# Patient Record
Sex: MALE | Race: WHITE | HISPANIC OR LATINO | Employment: OTHER | URBAN - METROPOLITAN AREA
[De-identification: names, ages, dates, MRNs, and addresses within clinical notes are randomized per-mention and may not be internally consistent; named-entity substitution may affect disease eponyms.]

---

## 2017-01-01 ENCOUNTER — TELEPHONE (OUTPATIENT)
Dept: INTERVENTIONAL RADIOLOGY/VASCULAR | Facility: HOSPITAL | Age: 72
End: 2017-01-01

## 2017-01-01 ENCOUNTER — PATIENT MESSAGE (OUTPATIENT)
Dept: INTERVENTIONAL RADIOLOGY/VASCULAR | Facility: HOSPITAL | Age: 72
End: 2017-01-01

## 2017-01-01 ENCOUNTER — SURGERY (OUTPATIENT)
Age: 72
End: 2017-01-01

## 2017-01-01 ENCOUNTER — LAB VISIT (OUTPATIENT)
Dept: LAB | Facility: HOSPITAL | Age: 72
End: 2017-01-01
Payer: COMMERCIAL

## 2017-01-01 ENCOUNTER — HOSPITAL ENCOUNTER (OUTPATIENT)
Dept: RADIOLOGY | Facility: HOSPITAL | Age: 72
Discharge: HOME | End: 2017-01-16
Attending: FAMILY MEDICINE
Payer: COMMERCIAL

## 2017-01-01 ENCOUNTER — LAB VISIT (OUTPATIENT)
Dept: LAB | Facility: HOSPITAL | Age: 72
End: 2017-01-01
Attending: INTERNAL MEDICINE
Payer: COMMERCIAL

## 2017-01-01 ENCOUNTER — HOSPITAL ENCOUNTER (OUTPATIENT)
Facility: HOSPITAL | Age: 72
Discharge: HOME | End: 2017-01-19
Attending: RADIOLOGY | Admitting: RADIOLOGY
Payer: COMMERCIAL

## 2017-01-01 ENCOUNTER — DOCUMENTATION ONLY (OUTPATIENT)
Dept: INTERNAL MEDICINE | Facility: CLINIC | Age: 72
End: 2017-01-01

## 2017-01-01 ENCOUNTER — OFFICE VISIT (OUTPATIENT)
Dept: HEMATOLOGY/ONCOLOGY | Facility: CLINIC | Age: 72
End: 2017-01-01
Payer: COMMERCIAL

## 2017-01-01 ENCOUNTER — TELEPHONE (OUTPATIENT)
Dept: FAMILY MEDICINE | Facility: CLINIC | Age: 72
End: 2017-01-01

## 2017-01-01 ENCOUNTER — HOSPITAL ENCOUNTER (INPATIENT)
Facility: HOSPITAL | Age: 72
LOS: 4 days | DRG: 441 | End: 2017-02-13
Attending: EMERGENCY MEDICINE | Admitting: HOSPITALIST
Payer: COMMERCIAL

## 2017-01-01 ENCOUNTER — INFUSION (OUTPATIENT)
Dept: INFUSION THERAPY | Facility: HOSPITAL | Age: 72
End: 2017-01-01
Attending: INTERNAL MEDICINE
Payer: COMMERCIAL

## 2017-01-01 ENCOUNTER — OFFICE VISIT (OUTPATIENT)
Dept: HEPATOLOGY | Facility: CLINIC | Age: 72
End: 2017-01-01
Payer: COMMERCIAL

## 2017-01-01 ENCOUNTER — OFFICE VISIT (OUTPATIENT)
Dept: INTERNAL MEDICINE | Facility: CLINIC | Age: 72
DRG: 441 | End: 2017-01-01
Payer: COMMERCIAL

## 2017-01-01 ENCOUNTER — TELEPHONE (OUTPATIENT)
Dept: HEPATOLOGY | Facility: CLINIC | Age: 72
End: 2017-01-01

## 2017-01-01 ENCOUNTER — HOSPITAL ENCOUNTER (EMERGENCY)
Facility: HOSPITAL | Age: 72
Discharge: HOME | End: 2017-01-16
Attending: EMERGENCY MEDICINE
Payer: COMMERCIAL

## 2017-01-01 ENCOUNTER — OFFICE VISIT (OUTPATIENT)
Dept: INTERVENTIONAL RADIOLOGY/VASCULAR | Facility: CLINIC | Age: 72
End: 2017-01-01
Payer: COMMERCIAL

## 2017-01-01 ENCOUNTER — RESEARCH ENCOUNTER (OUTPATIENT)
Dept: RESEARCH | Facility: HOSPITAL | Age: 72
End: 2017-01-01

## 2017-01-01 VITALS
TEMPERATURE: 98 F | OXYGEN SATURATION: 100 % | SYSTOLIC BLOOD PRESSURE: 62 MMHG | WEIGHT: 146.38 LBS | HEIGHT: 66 IN | BODY MASS INDEX: 23.53 KG/M2 | DIASTOLIC BLOOD PRESSURE: 41 MMHG

## 2017-01-01 VITALS — SYSTOLIC BLOOD PRESSURE: 106 MMHG | HEART RATE: 78 BPM | RESPIRATION RATE: 14 BRPM | DIASTOLIC BLOOD PRESSURE: 56 MMHG

## 2017-01-01 VITALS
OXYGEN SATURATION: 98 % | DIASTOLIC BLOOD PRESSURE: 74 MMHG | RESPIRATION RATE: 18 BRPM | TEMPERATURE: 98 F | HEIGHT: 66 IN | SYSTOLIC BLOOD PRESSURE: 118 MMHG | HEART RATE: 98 BPM | BODY MASS INDEX: 22.98 KG/M2 | WEIGHT: 143 LBS

## 2017-01-01 VITALS
TEMPERATURE: 97 F | DIASTOLIC BLOOD PRESSURE: 60 MMHG | RESPIRATION RATE: 15 BRPM | HEART RATE: 84 BPM | SYSTOLIC BLOOD PRESSURE: 102 MMHG | WEIGHT: 138.88 LBS | HEIGHT: 66 IN | BODY MASS INDEX: 22.32 KG/M2 | OXYGEN SATURATION: 98 %

## 2017-01-01 VITALS
HEART RATE: 77 BPM | RESPIRATION RATE: 18 BRPM | HEIGHT: 66 IN | SYSTOLIC BLOOD PRESSURE: 110 MMHG | DIASTOLIC BLOOD PRESSURE: 59 MMHG | BODY MASS INDEX: 22.18 KG/M2 | OXYGEN SATURATION: 99 % | WEIGHT: 138 LBS

## 2017-01-01 VITALS
DIASTOLIC BLOOD PRESSURE: 63 MMHG | SYSTOLIC BLOOD PRESSURE: 112 MMHG | HEIGHT: 66 IN | RESPIRATION RATE: 16 BRPM | HEART RATE: 89 BPM | BODY MASS INDEX: 22.18 KG/M2 | WEIGHT: 138 LBS

## 2017-01-01 VITALS
WEIGHT: 142 LBS | TEMPERATURE: 97 F | SYSTOLIC BLOOD PRESSURE: 89 MMHG | DIASTOLIC BLOOD PRESSURE: 59 MMHG | BODY MASS INDEX: 22.92 KG/M2 | HEART RATE: 98 BPM

## 2017-01-01 VITALS
WEIGHT: 141 LBS | BODY MASS INDEX: 22.66 KG/M2 | SYSTOLIC BLOOD PRESSURE: 103 MMHG | DIASTOLIC BLOOD PRESSURE: 62 MMHG | HEIGHT: 66 IN | HEART RATE: 87 BPM

## 2017-01-01 DIAGNOSIS — C22.0 HCC (HEPATOCELLULAR CARCINOMA): ICD-10-CM

## 2017-01-01 DIAGNOSIS — N17.9 ACUTE RENAL FAILURE: ICD-10-CM

## 2017-01-01 DIAGNOSIS — N19 RENAL FAILURE: Primary | ICD-10-CM

## 2017-01-01 DIAGNOSIS — R82.90 ABNORMAL URINE FINDINGS: ICD-10-CM

## 2017-01-01 DIAGNOSIS — C22.0 HCC (HEPATOCELLULAR CARCINOMA): Primary | ICD-10-CM

## 2017-01-01 DIAGNOSIS — I10 ESSENTIAL HYPERTENSION: ICD-10-CM

## 2017-01-01 DIAGNOSIS — E87.20 METABOLIC ACIDOSIS, NORMAL ANION GAP (NAG): ICD-10-CM

## 2017-01-01 DIAGNOSIS — R18.8 ASCITES OF LIVER: ICD-10-CM

## 2017-01-01 DIAGNOSIS — N17.9 AKI (ACUTE KIDNEY INJURY): ICD-10-CM

## 2017-01-01 DIAGNOSIS — E87.5 HYPERKALEMIA: ICD-10-CM

## 2017-01-01 DIAGNOSIS — E87.5 HYPERKALEMIA: Primary | ICD-10-CM

## 2017-01-01 DIAGNOSIS — Z51.11 ENCOUNTER FOR CHEMOTHERAPY MANAGEMENT: ICD-10-CM

## 2017-01-01 DIAGNOSIS — R79.89 ABNORMAL LFTS (LIVER FUNCTION TESTS): ICD-10-CM

## 2017-01-01 DIAGNOSIS — N17.9 ACUTE RENAL FAILURE, UNSPECIFIED ACUTE RENAL FAILURE TYPE: ICD-10-CM

## 2017-01-01 DIAGNOSIS — I95.9 HYPOTENSION, UNSPECIFIED HYPOTENSION TYPE: ICD-10-CM

## 2017-01-01 DIAGNOSIS — K74.60 CIRRHOSIS OF LIVER WITH ASCITES, UNSPECIFIED HEPATIC CIRRHOSIS TYPE: ICD-10-CM

## 2017-01-01 DIAGNOSIS — R18.8 OTHER ASCITES: ICD-10-CM

## 2017-01-01 DIAGNOSIS — G93.41 ACUTE METABOLIC ENCEPHALOPATHY: ICD-10-CM

## 2017-01-01 DIAGNOSIS — N18.30 CKD (CHRONIC KIDNEY DISEASE) STAGE 3, GFR 30-59 ML/MIN: ICD-10-CM

## 2017-01-01 DIAGNOSIS — G93.41 ENCEPHALOPATHY, METABOLIC: ICD-10-CM

## 2017-01-01 DIAGNOSIS — J96.01 ACUTE HYPOXEMIC RESPIRATORY FAILURE: ICD-10-CM

## 2017-01-01 DIAGNOSIS — R18.8 CIRRHOSIS OF LIVER WITH ASCITES, UNSPECIFIED HEPATIC CIRRHOSIS TYPE: ICD-10-CM

## 2017-01-01 DIAGNOSIS — D63.0 ANEMIA IN NEOPLASTIC DISEASE: ICD-10-CM

## 2017-01-01 LAB
ABO + RH BLD: NORMAL
ALBUMIN SERPL BCP-MCNC: 2 G/DL
ALBUMIN SERPL BCP-MCNC: 2.1 G/DL
ALBUMIN SERPL BCP-MCNC: 2.2 G/DL
ALBUMIN SERPL BCP-MCNC: 2.7 G/DL
ALBUMIN SERPL BCP-MCNC: 3.5 G/DL
ALBUMIN SERPL BCP-MCNC: 3.6 G/DL
ALBUMIN SERPL BCP-MCNC: 3.7 G/DL
ALBUMIN SERPL BCP-MCNC: 3.8 G/DL
ALBUMIN SERPL BCP-MCNC: 3.9 G/DL
ALBUMIN SERPL BCP-MCNC: 3.9 G/DL
ALLENS TEST: ABNORMAL
ALP SERPL-CCNC: 109 U/L
ALP SERPL-CCNC: 171 U/L
ALP SERPL-CCNC: 216 U/L
ALP SERPL-CCNC: 217 U/L
ALP SERPL-CCNC: 247 U/L
ALP SERPL-CCNC: 283 U/L
ALP SERPL-CCNC: 77 U/L
ALT SERPL W/O P-5'-P-CCNC: 12 U/L
ALT SERPL W/O P-5'-P-CCNC: 16 U/L
ALT SERPL W/O P-5'-P-CCNC: 17 U/L
ALT SERPL W/O P-5'-P-CCNC: 20 U/L
ALT SERPL W/O P-5'-P-CCNC: 24 U/L
ALT SERPL W/O P-5'-P-CCNC: 27 U/L
ALT SERPL W/O P-5'-P-CCNC: 29 U/L
AMMONIA PLAS-SCNC: 216 UMOL/L
ANION GAP SERPL CALC-SCNC: 10 MMOL/L
ANION GAP SERPL CALC-SCNC: 11 MMOL/L
ANION GAP SERPL CALC-SCNC: 12 MMOL/L
ANION GAP SERPL CALC-SCNC: 14 MMOL/L
ANION GAP SERPL CALC-SCNC: 16 MMOL/L
ANION GAP SERPL CALC-SCNC: 5 MMOL/L
ANION GAP SERPL CALC-SCNC: 6 MMOL/L
ANION GAP SERPL CALC-SCNC: 7 MMOL/L
ANION GAP SERPL CALC-SCNC: 8 MMOL/L
ANISOCYTOSIS BLD QL SMEAR: SLIGHT
AST SERPL-CCNC: 26 U/L
AST SERPL-CCNC: 28 U/L
AST SERPL-CCNC: 34 U/L
AST SERPL-CCNC: 43 U/L
AST SERPL-CCNC: 48 U/L
AST SERPL-CCNC: 50 U/L
AST SERPL-CCNC: 54 U/L
BACTERIA #/AREA URNS AUTO: ABNORMAL /HPF
BACTERIA #/AREA URNS AUTO: ABNORMAL /HPF
BACTERIA STL CULT: NORMAL
BACTERIA UR CULT: NORMAL
BACTERIA UR CULT: NORMAL
BASOPHILS # BLD AUTO: 0 K/UL
BASOPHILS # BLD AUTO: 0.01 K/UL
BASOPHILS # BLD AUTO: 0.02 K/UL
BASOPHILS # BLD AUTO: 0.03 K/UL
BASOPHILS NFR BLD: 0 %
BASOPHILS NFR BLD: 0.1 %
BASOPHILS NFR BLD: 0.1 %
BASOPHILS NFR BLD: 0.2 %
BASOPHILS NFR BLD: 0.3 %
BASOPHILS NFR BLD: 0.3 %
BASOPHILS NFR BLD: 0.4 %
BASOPHILS NFR BLD: 0.6 %
BILIRUB SERPL-MCNC: 2.1 MG/DL
BILIRUB SERPL-MCNC: 2.2 MG/DL
BILIRUB SERPL-MCNC: 2.4 MG/DL
BILIRUB SERPL-MCNC: 2.6 MG/DL
BILIRUB SERPL-MCNC: 2.9 MG/DL
BILIRUB SERPL-MCNC: 3.2 MG/DL
BILIRUB SERPL-MCNC: 4.9 MG/DL
BILIRUB UR QL STRIP: NEGATIVE
BILIRUB UR QL STRIP: NEGATIVE
BLD GP AB SCN CELLS X3 SERPL QL: NORMAL
BLD PROD TYP BPU: NORMAL
BLOOD UNIT EXPIRATION DATE: NORMAL
BLOOD UNIT TYPE CODE: 5100
BLOOD UNIT TYPE CODE: 9500
BLOOD UNIT TYPE: NORMAL
BUN SERPL-MCNC: 10 MG/DL
BUN SERPL-MCNC: 10 MG/DL
BUN SERPL-MCNC: 2 MG/DL
BUN SERPL-MCNC: 2 MG/DL
BUN SERPL-MCNC: 3 MG/DL
BUN SERPL-MCNC: 35 MG/DL
BUN SERPL-MCNC: 4 MG/DL
BUN SERPL-MCNC: 42 MG/DL
BUN SERPL-MCNC: 42 MG/DL (ref 6–30)
BUN SERPL-MCNC: 47 MG/DL
BUN SERPL-MCNC: 52 MG/DL
BUN SERPL-MCNC: 84 MG/DL
BUN SERPL-MCNC: 88 MG/DL
BUN SERPL-MCNC: 90 MG/DL
BUN SERPL-MCNC: 91 MG/DL
C DIFF GDH STL QL: NEGATIVE
C DIFF TOX A+B STL QL IA: NEGATIVE
CA-I BLDV-SCNC: 1.12 MMOL/L
CALCIUM SERPL-MCNC: 7.8 MG/DL
CALCIUM SERPL-MCNC: 8 MG/DL
CALCIUM SERPL-MCNC: 8.1 MG/DL
CALCIUM SERPL-MCNC: 8.3 MG/DL
CALCIUM SERPL-MCNC: 8.5 MG/DL
CALCIUM SERPL-MCNC: 8.5 MG/DL
CALCIUM SERPL-MCNC: 8.6 MG/DL
CALCIUM SERPL-MCNC: 8.7 MG/DL
CALCIUM SERPL-MCNC: 8.7 MG/DL
CALCIUM SERPL-MCNC: 9.1 MG/DL
CHLORIDE SERPL-SCNC: 107 MMOL/L
CHLORIDE SERPL-SCNC: 108 MMOL/L
CHLORIDE SERPL-SCNC: 111 MMOL/L
CHLORIDE SERPL-SCNC: 111 MMOL/L (ref 95–110)
CHLORIDE SERPL-SCNC: 112 MMOL/L
CHLORIDE SERPL-SCNC: 112 MMOL/L
CHLORIDE SERPL-SCNC: 113 MMOL/L
CHLORIDE STL-SCNC: NORMAL MMOL/L
CHLORIDE UR-SCNC: 27 MMOL/L
CLARITY UR REFRACT.AUTO: ABNORMAL
CLARITY UR REFRACT.AUTO: CLEAR
CO2 SERPL-SCNC: 10 MMOL/L
CO2 SERPL-SCNC: 15 MMOL/L
CO2 SERPL-SCNC: 16 MMOL/L
CO2 SERPL-SCNC: 16 MMOL/L
CO2 SERPL-SCNC: 18 MMOL/L
CO2 SERPL-SCNC: 21 MMOL/L
CO2 SERPL-SCNC: 21 MMOL/L
CO2 SERPL-SCNC: 23 MMOL/L
CO2 SERPL-SCNC: 24 MMOL/L
CO2 SERPL-SCNC: 24 MMOL/L
CO2 SERPL-SCNC: 25 MMOL/L
CO2 SERPL-SCNC: 7 MMOL/L
CO2 SERPL-SCNC: 8 MMOL/L
CO2 SERPL-SCNC: 9 MMOL/L
CODING SYSTEM: NORMAL
COLOR UR AUTO: YELLOW
COLOR UR AUTO: YELLOW
CREAT SERPL-MCNC: 0.7 MG/DL
CREAT SERPL-MCNC: 0.7 MG/DL
CREAT SERPL-MCNC: 0.8 MG/DL
CREAT SERPL-MCNC: 0.8 MG/DL
CREAT SERPL-MCNC: 1.3 MG/DL
CREAT SERPL-MCNC: 1.6 MG/DL
CREAT SERPL-MCNC: 1.6 MG/DL (ref 0.5–1.4)
CREAT SERPL-MCNC: 1.8 MG/DL
CREAT SERPL-MCNC: 3.4 MG/DL
CREAT SERPL-MCNC: 5.7 MG/DL
CREAT SERPL-MCNC: 5.9 MG/DL
CREAT SERPL-MCNC: 6 MG/DL
CREAT SERPL-MCNC: 6.2 MG/DL
CREAT UR-MCNC: 106 MG/DL
CREAT UR-MCNC: 106 MG/DL
DELSYS: ABNORMAL
DIFFERENTIAL METHOD: ABNORMAL
DISPENSE STATUS: NORMAL
E COLI SXT1 STL QL IA: NEGATIVE
E COLI SXT2 STL QL IA: NEGATIVE
EOSINOPHIL # BLD AUTO: 0 K/UL
EOSINOPHIL # BLD AUTO: 0 K/UL
EOSINOPHIL # BLD AUTO: 0.1 K/UL
EOSINOPHIL NFR BLD: 0.3 %
EOSINOPHIL NFR BLD: 0.8 %
EOSINOPHIL NFR BLD: 0.9 %
EOSINOPHIL NFR BLD: 1 %
EOSINOPHIL NFR BLD: 1 %
EOSINOPHIL NFR BLD: 1.3 %
EOSINOPHIL NFR BLD: 1.5 %
EOSINOPHIL NFR BLD: 1.8 %
EOSINOPHIL URNS QL WRIGHT STN: NORMAL
ERYTHROCYTE [DISTWIDTH] IN BLOOD BY AUTOMATED COUNT: 14.8 %
ERYTHROCYTE [DISTWIDTH] IN BLOOD BY AUTOMATED COUNT: 14.8 %
ERYTHROCYTE [DISTWIDTH] IN BLOOD BY AUTOMATED COUNT: 15 %
ERYTHROCYTE [DISTWIDTH] IN BLOOD BY AUTOMATED COUNT: 15.3 %
ERYTHROCYTE [DISTWIDTH] IN BLOOD BY AUTOMATED COUNT: 15.3 %
ERYTHROCYTE [DISTWIDTH] IN BLOOD BY AUTOMATED COUNT: 15.9 %
ERYTHROCYTE [DISTWIDTH] IN BLOOD BY AUTOMATED COUNT: 16.1 %
ERYTHROCYTE [DISTWIDTH] IN BLOOD BY AUTOMATED COUNT: 18.6 %
ERYTHROCYTE [DISTWIDTH] IN BLOOD BY AUTOMATED COUNT: 18.9 %
ERYTHROCYTE [DISTWIDTH] IN BLOOD BY AUTOMATED COUNT: 19.1 %
ERYTHROCYTE [SEDIMENTATION RATE] IN BLOOD BY WESTERGREN METHOD: 20 MM/H
EST. GFR  (AFRICAN AMERICAN): 10.1 ML/MIN/1.73 M^2
EST. GFR  (AFRICAN AMERICAN): 10.3 ML/MIN/1.73 M^2
EST. GFR  (AFRICAN AMERICAN): 10.7 ML/MIN/1.73 M^2
EST. GFR  (AFRICAN AMERICAN): 20 ML/MIN/1.73 M^2
EST. GFR  (AFRICAN AMERICAN): 43.1 ML/MIN/1.73 M^2
EST. GFR  (AFRICAN AMERICAN): 49.7 ML/MIN/1.73 M^2
EST. GFR  (AFRICAN AMERICAN): 9.7 ML/MIN/1.73 M^2
EST. GFR  (AFRICAN AMERICAN): >60 ML/MIN/1.73 M^2
EST. GFR  (NON AFRICAN AMERICAN): 17.3 ML/MIN/1.73 M^2
EST. GFR  (NON AFRICAN AMERICAN): 37.3 ML/MIN/1.73 M^2
EST. GFR  (NON AFRICAN AMERICAN): 43 ML/MIN/1.73 M^2
EST. GFR  (NON AFRICAN AMERICAN): 55.3 ML/MIN/1.73 M^2
EST. GFR  (NON AFRICAN AMERICAN): 8.4 ML/MIN/1.73 M^2
EST. GFR  (NON AFRICAN AMERICAN): 8.7 ML/MIN/1.73 M^2
EST. GFR  (NON AFRICAN AMERICAN): 8.9 ML/MIN/1.73 M^2
EST. GFR  (NON AFRICAN AMERICAN): 9.3 ML/MIN/1.73 M^2
EST. GFR  (NON AFRICAN AMERICAN): >60 ML/MIN/1.73 M^2
FAT STL SUDAN IV STN: NORMAL
FIBRINOGEN PPP-MCNC: 110 MG/DL
FIBRINOGEN PPP-MCNC: 190 MG/DL
FIBRINOGEN PPP-MCNC: 206 MG/DL
FIBRINOGEN PPP-MCNC: 212 MG/DL
FIBRINOGEN PPP-MCNC: 239 MG/DL
FIBRINOGEN PPP-MCNC: 300 MG/DL
FIBRINOGEN PPP-MCNC: 95 MG/DL
FIO2: 21
FIO2: 50
FIO2: 60
FLOW: 2
GLUCOSE SERPL-MCNC: 101 MG/DL
GLUCOSE SERPL-MCNC: 118 MG/DL
GLUCOSE SERPL-MCNC: 119 MG/DL
GLUCOSE SERPL-MCNC: 123 MG/DL
GLUCOSE SERPL-MCNC: 125 MG/DL
GLUCOSE SERPL-MCNC: 129 MG/DL
GLUCOSE SERPL-MCNC: 133 MG/DL
GLUCOSE SERPL-MCNC: 143 MG/DL
GLUCOSE SERPL-MCNC: 143 MG/DL
GLUCOSE SERPL-MCNC: 146 MG/DL
GLUCOSE SERPL-MCNC: 83 MG/DL
GLUCOSE SERPL-MCNC: 92 MG/DL
GLUCOSE SERPL-MCNC: 97 MG/DL
GLUCOSE SERPL-MCNC: 97 MG/DL
GLUCOSE SERPL-MCNC: 97 MG/DL (ref 70–110)
GLUCOSE UR QL STRIP: NEGATIVE
GLUCOSE UR QL STRIP: NEGATIVE
HAPTOGLOB SERPL-MCNC: 37 MG/DL
HCO3 UR-SCNC: 10.7 MMOL/L (ref 24–28)
HCO3 UR-SCNC: 15.2 MMOL/L (ref 24–28)
HCO3 UR-SCNC: 18.3 MMOL/L (ref 24–28)
HCO3 UR-SCNC: 22.6 MMOL/L (ref 24–28)
HCO3 UR-SCNC: 25.5 MMOL/L (ref 24–28)
HCO3 UR-SCNC: 8.5 MMOL/L (ref 24–28)
HCO3 UR-SCNC: 8.8 MMOL/L (ref 24–28)
HCO3 UR-SCNC: 9 MMOL/L (ref 24–28)
HCT VFR BLD AUTO: 17.8 %
HCT VFR BLD AUTO: 19.3 %
HCT VFR BLD AUTO: 20.5 %
HCT VFR BLD AUTO: 21.2 %
HCT VFR BLD AUTO: 22.5 %
HCT VFR BLD AUTO: 22.6 %
HCT VFR BLD AUTO: 25.1 %
HCT VFR BLD AUTO: 28.3 %
HCT VFR BLD AUTO: 28.9 %
HCT VFR BLD AUTO: 34.6 %
HCT VFR BLD CALC: 26 %PCV (ref 36–54)
HCT VFR BLD CALC: 34 %PCV (ref 36–54)
HEPARIN INDUCED THROMBOCYTOPENIA: NORMAL
HGB BLD-MCNC: 10.3 G/DL
HGB BLD-MCNC: 11.8 G/DL
HGB BLD-MCNC: 6.3 G/DL
HGB BLD-MCNC: 6.6 G/DL
HGB BLD-MCNC: 7.1 G/DL
HGB BLD-MCNC: 7.4 G/DL
HGB BLD-MCNC: 7.8 G/DL
HGB BLD-MCNC: 8.2 G/DL
HGB BLD-MCNC: 8.9 G/DL
HGB BLD-MCNC: 9.9 G/DL
HGB UR QL STRIP: ABNORMAL
HGB UR QL STRIP: NEGATIVE
HYALINE CASTS UR QL AUTO: 9 /LPF
HYALINE CASTS UR QL AUTO: 9 /LPF
HYPOCHROMIA BLD QL SMEAR: ABNORMAL
HYPOCHROMIA BLD QL SMEAR: ABNORMAL
INR PPP: 1.2
INR PPP: 1.4
INR PPP: 1.5
INR PPP: 1.6
INR PPP: 1.7
INR PPP: 1.7
KETONES UR QL STRIP: NEGATIVE
KETONES UR QL STRIP: NEGATIVE
LACTATE SERPL-SCNC: 2.1 MMOL/L
LACTATE SERPL-SCNC: 2.1 MMOL/L
LACTATE SERPL-SCNC: 2.3 MMOL/L
LACTATE SERPL-SCNC: 3.3 MMOL/L
LACTATE SERPL-SCNC: 3.7 MMOL/L
LDH SERPL L TO P-CCNC: 136 U/L
LEUKOCYTE ESTERASE UR QL STRIP: ABNORMAL
LEUKOCYTE ESTERASE UR QL STRIP: ABNORMAL
LYMPHOCYTES # BLD AUTO: 0.3 K/UL
LYMPHOCYTES # BLD AUTO: 0.4 K/UL
LYMPHOCYTES # BLD AUTO: 0.5 K/UL
LYMPHOCYTES # BLD AUTO: 0.6 K/UL
LYMPHOCYTES # BLD AUTO: 0.7 K/UL
LYMPHOCYTES # BLD AUTO: 0.7 K/UL
LYMPHOCYTES # BLD AUTO: 0.9 K/UL
LYMPHOCYTES # BLD AUTO: 1 K/UL
LYMPHOCYTES NFR BLD: 10.7 %
LYMPHOCYTES NFR BLD: 11 %
LYMPHOCYTES NFR BLD: 11.2 %
LYMPHOCYTES NFR BLD: 11.4 %
LYMPHOCYTES NFR BLD: 12.5 %
LYMPHOCYTES NFR BLD: 14.2 %
LYMPHOCYTES NFR BLD: 7.9 %
LYMPHOCYTES NFR BLD: 8.1 %
LYMPHOCYTES NFR BLD: 9.2 %
LYMPHOCYTES NFR BLD: 9.7 %
MAGNESIUM FLD-MCNC: NORMAL MG/DL
MAGNESIUM SERPL-MCNC: 1.7 MG/DL
MAGNESIUM SERPL-MCNC: 1.8 MG/DL
MAGNESIUM SERPL-MCNC: 1.9 MG/DL
MAGNESIUM SERPL-MCNC: 2 MG/DL
MAGNESIUM SERPL-MCNC: 2.1 MG/DL
MAGNESIUM SERPL-MCNC: 2.1 MG/DL
MAGNESIUM SERPL-MCNC: 2.2 MG/DL
MAGNESIUM SERPL-MCNC: 2.3 MG/DL
MCH RBC QN AUTO: 32.5 PG
MCH RBC QN AUTO: 34 PG
MCH RBC QN AUTO: 34 PG
MCH RBC QN AUTO: 36.5 PG
MCH RBC QN AUTO: 36.8 PG
MCH RBC QN AUTO: 37.1 PG
MCH RBC QN AUTO: 37.1 PG
MCH RBC QN AUTO: 37.3 PG
MCH RBC QN AUTO: 37.6 PG
MCH RBC QN AUTO: 38 PG
MCHC RBC AUTO-ENTMCNC: 33.5 %
MCHC RBC AUTO-ENTMCNC: 34.1 %
MCHC RBC AUTO-ENTMCNC: 34.2 %
MCHC RBC AUTO-ENTMCNC: 34.5 %
MCHC RBC AUTO-ENTMCNC: 35 %
MCHC RBC AUTO-ENTMCNC: 35.4 %
MCHC RBC AUTO-ENTMCNC: 35.5 %
MCHC RBC AUTO-ENTMCNC: 35.6 %
MCHC RBC AUTO-ENTMCNC: 36.1 %
MCHC RBC AUTO-ENTMCNC: 36.4 %
MCV RBC AUTO: 100 FL
MCV RBC AUTO: 101 FL
MCV RBC AUTO: 102 FL
MCV RBC AUTO: 103 FL
MCV RBC AUTO: 104 FL
MCV RBC AUTO: 104 FL
MCV RBC AUTO: 105 FL
MCV RBC AUTO: 107 FL
MCV RBC AUTO: 110 FL
MCV RBC AUTO: 94 FL
MICROSCOPIC COMMENT: ABNORMAL
MICROSCOPIC COMMENT: ABNORMAL
MODE: ABNORMAL
MONOCYTES # BLD AUTO: 0 K/UL
MONOCYTES # BLD AUTO: 0.5 K/UL
MONOCYTES # BLD AUTO: 0.6 K/UL
MONOCYTES # BLD AUTO: 0.6 K/UL
MONOCYTES # BLD AUTO: 0.7 K/UL
MONOCYTES # BLD AUTO: 0.8 K/UL
MONOCYTES # BLD AUTO: 1 K/UL
MONOCYTES NFR BLD: 0.9 %
MONOCYTES NFR BLD: 10.1 %
MONOCYTES NFR BLD: 15.6 %
MONOCYTES NFR BLD: 16.9 %
MONOCYTES NFR BLD: 17.2 %
MONOCYTES NFR BLD: 6.6 %
MONOCYTES NFR BLD: 6.7 %
MONOCYTES NFR BLD: 9 %
MONOCYTES NFR BLD: 9 %
MONOCYTES NFR BLD: 9.8 %
NEUTROPHILS # BLD AUTO: 2.2 K/UL
NEUTROPHILS # BLD AUTO: 2.7 K/UL
NEUTROPHILS # BLD AUTO: 2.8 K/UL
NEUTROPHILS # BLD AUTO: 3.8 K/UL
NEUTROPHILS # BLD AUTO: 6 K/UL
NEUTROPHILS # BLD AUTO: 6.2 K/UL
NEUTROPHILS # BLD AUTO: 6.6 K/UL
NEUTROPHILS # BLD AUTO: 7 K/UL
NEUTROPHILS # BLD AUTO: 7.5 K/UL
NEUTROPHILS # BLD AUTO: 7.6 K/UL
NEUTROPHILS NFR BLD: 66.2 %
NEUTROPHILS NFR BLD: 70.2 %
NEUTROPHILS NFR BLD: 70.3 %
NEUTROPHILS NFR BLD: 78.5 %
NEUTROPHILS NFR BLD: 78.8 %
NEUTROPHILS NFR BLD: 79 %
NEUTROPHILS NFR BLD: 81.1 %
NEUTROPHILS NFR BLD: 81.1 %
NEUTROPHILS NFR BLD: 84.4 %
NEUTROPHILS NFR BLD: 88.8 %
NITRITE UR QL STRIP: NEGATIVE
NITRITE UR QL STRIP: NEGATIVE
NUM UNITS TRANS FFP: NORMAL
O+P STL TRI STN: NORMAL
OSMOL GAP STL: NORMAL MOSM/KG
OSMOLALITY STL: NORMAL MOSM/KG
OVALOCYTES BLD QL SMEAR: ABNORMAL
PCO2 BLDA: 16.8 MMHG (ref 35–45)
PCO2 BLDA: 17.1 MMHG (ref 35–45)
PCO2 BLDA: 19.4 MMHG (ref 35–45)
PCO2 BLDA: 23.1 MMHG (ref 35–45)
PCO2 BLDA: 25.6 MMHG (ref 35–45)
PCO2 BLDA: 28 MMHG (ref 35–45)
PCO2 BLDA: 35.5 MMHG (ref 35–45)
PCO2 BLDA: 42.2 MMHG (ref 35–45)
PEEP: 5
PH SMN: 7.28 [PH] (ref 7.35–7.45)
PH SMN: 7.28 [PH] (ref 7.35–7.45)
PH SMN: 7.31 [PH] (ref 7.35–7.45)
PH SMN: 7.32 [PH] (ref 7.35–7.45)
PH SMN: 7.38 [PH] (ref 7.35–7.45)
PH SMN: 7.39 [PH] (ref 7.35–7.45)
PH SMN: 7.41 [PH] (ref 7.35–7.45)
PH SMN: 7.42 [PH] (ref 7.35–7.45)
PH UR STRIP: 5 [PH] (ref 5–8)
PH UR STRIP: 6 [PH] (ref 5–8)
PHOSPHATE SERPL-MCNC: 1.6 MG/DL
PHOSPHATE SERPL-MCNC: 1.6 MG/DL
PHOSPHATE SERPL-MCNC: 2.2 MG/DL
PHOSPHATE SERPL-MCNC: 2.2 MG/DL
PHOSPHATE SERPL-MCNC: 2.9 MG/DL
PHOSPHATE SERPL-MCNC: 2.9 MG/DL
PHOSPHATE SERPL-MCNC: 3.6 MG/DL
PHOSPHATE SERPL-MCNC: 3.8 MG/DL
PHOSPHATE SERPL-MCNC: 5.7 MG/DL
PHOSPHATE SERPL-MCNC: 6.6 MG/DL
PHOSPHATE SERPL-MCNC: 7 MG/DL
PHOSPHORUS, FECES: NORMAL
PLATELET # BLD AUTO: 102 K/UL
PLATELET # BLD AUTO: 103 K/UL
PLATELET # BLD AUTO: 126 K/UL
PLATELET # BLD AUTO: 33 K/UL
PLATELET # BLD AUTO: 33 K/UL
PLATELET # BLD AUTO: 42 K/UL
PLATELET # BLD AUTO: 51 K/UL
PLATELET # BLD AUTO: 60 K/UL
PLATELET # BLD AUTO: 80 K/UL
PLATELET # BLD AUTO: 81 K/UL
PLATELET BLD QL SMEAR: ABNORMAL
PLATELET BLD QL SMEAR: ABNORMAL
PMV BLD AUTO: 10 FL
PMV BLD AUTO: 10.2 FL
PMV BLD AUTO: 10.9 FL
PMV BLD AUTO: 11.2 FL
PMV BLD AUTO: 12.5 FL
PMV BLD AUTO: 8.7 FL
PMV BLD AUTO: 9.3 FL
PMV BLD AUTO: 9.7 FL
PMV BLD AUTO: 9.8 FL
PMV BLD AUTO: ABNORMAL FL
PO2 BLDA: 101 MMHG (ref 80–100)
PO2 BLDA: 106 MMHG (ref 80–100)
PO2 BLDA: 33 MMHG (ref 40–60)
PO2 BLDA: 34 MMHG (ref 40–60)
PO2 BLDA: 36 MMHG (ref 40–60)
PO2 BLDA: 68 MMHG (ref 40–60)
PO2 BLDA: 69 MMHG (ref 80–100)
PO2 BLDA: 98 MMHG (ref 80–100)
POC BE: -10 MMOL/L
POC BE: -16 MMOL/L
POC BE: -17 MMOL/L
POC BE: -18 MMOL/L
POC BE: -18 MMOL/L
POC BE: -2 MMOL/L
POC BE: -6 MMOL/L
POC BE: 1 MMOL/L
POC IONIZED CALCIUM: 1.11 MMOL/L (ref 1.06–1.42)
POC IONIZED CALCIUM: 1.19 MMOL/L (ref 1.06–1.42)
POC SATURATED O2: 67 % (ref 95–100)
POC SATURATED O2: 67 % (ref 95–100)
POC SATURATED O2: 70 % (ref 95–100)
POC SATURATED O2: 91 % (ref 95–100)
POC SATURATED O2: 93 % (ref 95–100)
POC SATURATED O2: 97 % (ref 95–100)
POC SATURATED O2: 97 % (ref 95–100)
POC SATURATED O2: 98 % (ref 95–100)
POC TCO2 (MEASURED): ABNORMAL MMOL/L
POC TCO2: 10 MMOL/L (ref 23–27)
POC TCO2: 11 MMOL/L (ref 24–29)
POC TCO2: 16 MMOL/L (ref 24–29)
POC TCO2: 19 MMOL/L (ref 24–29)
POC TCO2: 24 MMOL/L (ref 24–29)
POC TCO2: 27 MMOL/L (ref 23–27)
POC TCO2: 9 MMOL/L (ref 23–27)
POC TCO2: 9 MMOL/L (ref 23–27)
POCT GLUCOSE: 104 MG/DL (ref 70–110)
POCT GLUCOSE: 115 MG/DL (ref 70–110)
POCT GLUCOSE: 118 MG/DL (ref 70–110)
POCT GLUCOSE: 119 MG/DL (ref 70–110)
POCT GLUCOSE: 121 MG/DL (ref 70–110)
POCT GLUCOSE: 136 MG/DL (ref 70–110)
POCT GLUCOSE: 144 MG/DL (ref 70–110)
POCT GLUCOSE: 148 MG/DL (ref 70–110)
POCT GLUCOSE: 148 MG/DL (ref 70–110)
POCT GLUCOSE: 160 MG/DL (ref 70–110)
POIKILOCYTOSIS BLD QL SMEAR: SLIGHT
POLYCHROMASIA BLD QL SMEAR: ABNORMAL
POOLED CRYOPPT GVN BPU: NORMAL
POOLED CRYOPPT GVN BPU: NORMAL
POTASSIUM BLD-SCNC: 4.1 MMOL/L (ref 3.5–5.1)
POTASSIUM BLD-SCNC: 5.1 MMOL/L (ref 3.5–5.1)
POTASSIUM FLD-SCNC: NORMAL MMOL/L
POTASSIUM SERPL-SCNC: 4 MMOL/L
POTASSIUM SERPL-SCNC: 4.1 MMOL/L
POTASSIUM SERPL-SCNC: 4.3 MMOL/L
POTASSIUM SERPL-SCNC: 4.3 MMOL/L
POTASSIUM SERPL-SCNC: 4.4 MMOL/L
POTASSIUM SERPL-SCNC: 4.6 MMOL/L
POTASSIUM SERPL-SCNC: 4.6 MMOL/L
POTASSIUM SERPL-SCNC: 5.3 MMOL/L
POTASSIUM SERPL-SCNC: 5.3 MMOL/L
POTASSIUM SERPL-SCNC: 5.7 MMOL/L
POTASSIUM UR-SCNC: 19 MMOL/L
PROCALCITONIN SERPL IA-MCNC: 1.14 NG/ML
PROLACTIN SERPL IA-MCNC: 23.6 NG/ML
PROT SERPL-MCNC: 5.5 G/DL
PROT SERPL-MCNC: 6 G/DL
PROT SERPL-MCNC: 6.1 G/DL
PROT SERPL-MCNC: 6.6 G/DL
PROT SERPL-MCNC: 6.7 G/DL
PROT SERPL-MCNC: 6.8 G/DL
PROT SERPL-MCNC: 7 G/DL
PROT UR QL STRIP: ABNORMAL
PROT UR QL STRIP: NEGATIVE
PROT UR-MCNC: 14 MG/DL
PROT/CREAT RATIO, UR: 0.13
PROTHROMBIN TIME: 12.7 SEC
PROTHROMBIN TIME: 14.8 SEC
PROTHROMBIN TIME: 15.2 SEC
PROTHROMBIN TIME: 15.3 SEC
PROTHROMBIN TIME: 15.4 SEC
PROTHROMBIN TIME: 16.1 SEC
PROTHROMBIN TIME: 16.9 SEC
PROTHROMBIN TIME: 17 SEC
RBC # BLD AUTO: 1.66 M/UL
RBC # BLD AUTO: 1.94 M/UL
RBC # BLD AUTO: 1.97 M/UL
RBC # BLD AUTO: 2.09 M/UL
RBC # BLD AUTO: 2.21 M/UL
RBC # BLD AUTO: 2.4 M/UL
RBC # BLD AUTO: 2.44 M/UL
RBC # BLD AUTO: 2.69 M/UL
RBC # BLD AUTO: 2.78 M/UL
RBC # BLD AUTO: 3.16 M/UL
RBC #/AREA URNS AUTO: 1 /HPF (ref 0–4)
RBC #/AREA URNS AUTO: 1 /HPF (ref 0–4)
SAMPLE: ABNORMAL
SITE: ABNORMAL
SODIUM BLD-SCNC: 135 MMOL/L (ref 136–145)
SODIUM BLD-SCNC: 136 MMOL/L (ref 136–145)
SODIUM SERPL-SCNC: 130 MMOL/L
SODIUM SERPL-SCNC: 132 MMOL/L
SODIUM SERPL-SCNC: 133 MMOL/L
SODIUM SERPL-SCNC: 133 MMOL/L
SODIUM SERPL-SCNC: 134 MMOL/L
SODIUM SERPL-SCNC: 135 MMOL/L
SODIUM SERPL-SCNC: 136 MMOL/L
SODIUM SERPL-SCNC: 138 MMOL/L
SODIUM SERPL-SCNC: 139 MMOL/L
SODIUM STL-SCNC: NORMAL MMOL/L
SODIUM UR-SCNC: <20 MMOL/L
SP GR UR STRIP: 1.01 (ref 1–1.03)
SP GR UR STRIP: 1.01 (ref 1–1.03)
SP02: 100
SP02: 97
SP02: 98
SP02: 99
SQUAMOUS #/AREA URNS AUTO: 1 /HPF
TRANS ERYTHROCYTES VOL PATIENT: NORMAL ML
TRANS PLATPHERESIS VOL PATIENT: NORMAL ML
URN SPEC COLLECT METH UR: ABNORMAL
URN SPEC COLLECT METH UR: ABNORMAL
UROBILINOGEN UR STRIP-ACNC: NEGATIVE EU/DL
UROBILINOGEN UR STRIP-ACNC: NEGATIVE EU/DL
UUN UR-MCNC: 589 MG/DL
VANCOMYCIN TROUGH SERPL-MCNC: 9.1 UG/ML
VT: 450
VT: 500
WBC # BLD AUTO: 3.2 K/UL
WBC # BLD AUTO: 3.37 K/UL
WBC # BLD AUTO: 3.84 K/UL
WBC # BLD AUTO: 5.38 K/UL
WBC # BLD AUTO: 7.45 K/UL
WBC # BLD AUTO: 7.66 K/UL
WBC # BLD AUTO: 8.41 K/UL
WBC # BLD AUTO: 8.96 K/UL
WBC # BLD AUTO: 8.96 K/UL
WBC # BLD AUTO: 9.65 K/UL
WBC #/AREA STL HPF: NORMAL /[HPF]
WBC #/AREA URNS AUTO: 1 /HPF (ref 0–5)
WBC #/AREA URNS AUTO: 2 /HPF (ref 0–5)

## 2017-01-01 PROCEDURE — 80069 RENAL FUNCTION PANEL: CPT

## 2017-01-01 PROCEDURE — 25000003 PHARM REV CODE 250: Performed by: STUDENT IN AN ORGANIZED HEALTH CARE EDUCATION/TRAINING PROGRAM

## 2017-01-01 PROCEDURE — 36800 INSERTION OF CANNULA: CPT | Mod: ,,, | Performed by: INTERNAL MEDICINE

## 2017-01-01 PROCEDURE — 84146 ASSAY OF PROLACTIN: CPT

## 2017-01-01 PROCEDURE — 80053 COMPREHEN METABOLIC PANEL: CPT

## 2017-01-01 PROCEDURE — 63600175 PHARM REV CODE 636 W HCPCS: Performed by: RADIOLOGY

## 2017-01-01 PROCEDURE — 87427 SHIGA-LIKE TOXIN AG IA: CPT

## 2017-01-01 PROCEDURE — 85025 COMPLETE CBC W/AUTO DIFF WBC: CPT

## 2017-01-01 PROCEDURE — 85384 FIBRINOGEN ACTIVITY: CPT | Mod: 91

## 2017-01-01 PROCEDURE — 82803 BLOOD GASES ANY COMBINATION: CPT

## 2017-01-01 PROCEDURE — 86022 PLATELET ANTIBODIES: CPT

## 2017-01-01 PROCEDURE — 25000003 PHARM REV CODE 250: Performed by: RADIOLOGY

## 2017-01-01 PROCEDURE — 1126F AMNT PAIN NOTED NONE PRSNT: CPT | Mod: S$GLB,,, | Performed by: INTERNAL MEDICINE

## 2017-01-01 PROCEDURE — 25000003 PHARM REV CODE 250: Performed by: NURSE PRACTITIONER

## 2017-01-01 PROCEDURE — 80048 BASIC METABOLIC PNL TOTAL CA: CPT

## 2017-01-01 PROCEDURE — 63600175 PHARM REV CODE 636 W HCPCS: Performed by: NURSE PRACTITIONER

## 2017-01-01 PROCEDURE — 63600175 PHARM REV CODE 636 W HCPCS: Performed by: HOSPITALIST

## 2017-01-01 PROCEDURE — 82330 ASSAY OF CALCIUM: CPT

## 2017-01-01 PROCEDURE — 80100008 HC CRRT DAILY MAINTENANCE

## 2017-01-01 PROCEDURE — 99999 PR PBB SHADOW E&M-EST. PATIENT-LVL III: CPT | Mod: PBBFAC,,, | Performed by: INTERNAL MEDICINE

## 2017-01-01 PROCEDURE — 25500020 PHARM REV CODE 255: Performed by: RADIOLOGY

## 2017-01-01 PROCEDURE — 81001 URINALYSIS AUTO W/SCOPE: CPT

## 2017-01-01 PROCEDURE — 96361 HYDRATE IV INFUSION ADD-ON: CPT

## 2017-01-01 PROCEDURE — 3074F SYST BP LT 130 MM HG: CPT | Mod: S$GLB,,, | Performed by: NURSE PRACTITIONER

## 2017-01-01 PROCEDURE — 84295 ASSAY OF SERUM SODIUM: CPT

## 2017-01-01 PROCEDURE — 96375 TX/PRO/DX INJ NEW DRUG ADDON: CPT

## 2017-01-01 PROCEDURE — 89125 SPECIMEN FAT STAIN: CPT

## 2017-01-01 PROCEDURE — 99900026 HC AIRWAY MAINTENANCE (STAT)

## 2017-01-01 PROCEDURE — 94761 N-INVAS EAR/PLS OXIMETRY MLT: CPT

## 2017-01-01 PROCEDURE — P9047 ALBUMIN (HUMAN), 25%, 50ML: HCPCS | Performed by: HOSPITALIST

## 2017-01-01 PROCEDURE — 99215 OFFICE O/P EST HI 40 MIN: CPT | Mod: S$GLB,,, | Performed by: INTERNAL MEDICINE

## 2017-01-01 PROCEDURE — 83735 ASSAY OF MAGNESIUM: CPT | Mod: 91

## 2017-01-01 PROCEDURE — 85610 PROTHROMBIN TIME: CPT

## 2017-01-01 PROCEDURE — 87045 FECES CULTURE AEROBIC BACT: CPT

## 2017-01-01 PROCEDURE — 25000003 PHARM REV CODE 250: Performed by: INTERNAL MEDICINE

## 2017-01-01 PROCEDURE — 95816 EEG AWAKE AND DROWSY: CPT | Mod: 26,,, | Performed by: PSYCHIATRY & NEUROLOGY

## 2017-01-01 PROCEDURE — 27000221 HC OXYGEN, UP TO 24 HOURS

## 2017-01-01 PROCEDURE — 5A1945Z RESPIRATORY VENTILATION, 24-96 CONSECUTIVE HOURS: ICD-10-PCS | Performed by: INTERNAL MEDICINE

## 2017-01-01 PROCEDURE — P9035 PLATELET PHERES LEUKOREDUCED: HCPCS

## 2017-01-01 PROCEDURE — 1160F RVW MEDS BY RX/DR IN RCRD: CPT | Mod: S$GLB,,, | Performed by: INTERNAL MEDICINE

## 2017-01-01 PROCEDURE — 84100 ASSAY OF PHOSPHORUS: CPT

## 2017-01-01 PROCEDURE — 36600 WITHDRAWAL OF ARTERIAL BLOOD: CPT

## 2017-01-01 PROCEDURE — P9017 PLASMA 1 DONOR FRZ W/IN 8 HR: HCPCS

## 2017-01-01 PROCEDURE — C1752 CATH,HEMODIALYSIS,SHORT-TERM: HCPCS

## 2017-01-01 PROCEDURE — 90945 DIALYSIS ONE EVALUATION: CPT | Mod: ,,, | Performed by: INTERNAL MEDICINE

## 2017-01-01 PROCEDURE — 36415 COLL VENOUS BLD VENIPUNCTURE: CPT

## 2017-01-01 PROCEDURE — 25000003 PHARM REV CODE 250: Performed by: HOSPITALIST

## 2017-01-01 PROCEDURE — P9021 RED BLOOD CELLS UNIT: HCPCS

## 2017-01-01 PROCEDURE — 99900022

## 2017-01-01 PROCEDURE — 86900 BLOOD TYPING SEROLOGIC ABO: CPT

## 2017-01-01 PROCEDURE — 0BH17EZ INSERTION OF ENDOTRACHEAL AIRWAY INTO TRACHEA, VIA NATURAL OR ARTIFICIAL OPENING: ICD-10-PCS | Performed by: INTERNAL MEDICINE

## 2017-01-01 PROCEDURE — 84100 ASSAY OF PHOSPHORUS: CPT | Mod: 91

## 2017-01-01 PROCEDURE — 83605 ASSAY OF LACTIC ACID: CPT | Mod: 91

## 2017-01-01 PROCEDURE — 84132 ASSAY OF SERUM POTASSIUM: CPT

## 2017-01-01 PROCEDURE — 27200966 HC CLOSED SUCTION SYSTEM

## 2017-01-01 PROCEDURE — 82570 ASSAY OF URINE CREATININE: CPT

## 2017-01-01 PROCEDURE — 96366 THER/PROPH/DIAG IV INF ADDON: CPT

## 2017-01-01 PROCEDURE — 3074F SYST BP LT 130 MM HG: CPT | Mod: S$GLB,,, | Performed by: INTERNAL MEDICINE

## 2017-01-01 PROCEDURE — P9012 CRYOPRECIPITATE EACH UNIT: HCPCS

## 2017-01-01 PROCEDURE — 25000003 PHARM REV CODE 250: Performed by: EMERGENCY MEDICINE

## 2017-01-01 PROCEDURE — 63600175 PHARM REV CODE 636 W HCPCS: Performed by: INTERNAL MEDICINE

## 2017-01-01 PROCEDURE — 99214 OFFICE O/P EST MOD 30 MIN: CPT | Mod: S$GLB,,, | Performed by: INTERNAL MEDICINE

## 2017-01-01 PROCEDURE — P9047 ALBUMIN (HUMAN), 25%, 50ML: HCPCS | Performed by: STUDENT IN AN ORGANIZED HEALTH CARE EDUCATION/TRAINING PROGRAM

## 2017-01-01 PROCEDURE — 94002 VENT MGMT INPAT INIT DAY: CPT

## 2017-01-01 PROCEDURE — 95816 EEG AWAKE AND DROWSY: CPT

## 2017-01-01 PROCEDURE — 1157F ADVNC CARE PLAN IN RCRD: CPT | Mod: S$GLB,,, | Performed by: INTERNAL MEDICINE

## 2017-01-01 PROCEDURE — 96365 THER/PROPH/DIAG IV INF INIT: CPT

## 2017-01-01 PROCEDURE — 3078F DIAST BP <80 MM HG: CPT | Mod: S$GLB,,, | Performed by: INTERNAL MEDICINE

## 2017-01-01 PROCEDURE — 20000000 HC ICU ROOM

## 2017-01-01 PROCEDURE — 63600175 PHARM REV CODE 636 W HCPCS: Performed by: STUDENT IN AN ORGANIZED HEALTH CARE EDUCATION/TRAINING PROGRAM

## 2017-01-01 PROCEDURE — 1159F MED LIST DOCD IN RCRD: CPT | Mod: S$GLB,,, | Performed by: INTERNAL MEDICINE

## 2017-01-01 PROCEDURE — 85025 COMPLETE CBC W/AUTO DIFF WBC: CPT | Mod: 91

## 2017-01-01 PROCEDURE — 87205 SMEAR GRAM STAIN: CPT

## 2017-01-01 PROCEDURE — 99900035 HC TECH TIME PER 15 MIN (STAT)

## 2017-01-01 PROCEDURE — 99999 PR PBB SHADOW E&M-EST. PATIENT-LVL II: CPT | Mod: PBBFAC,,,

## 2017-01-01 PROCEDURE — 51702 INSERT TEMP BLADDER CATH: CPT

## 2017-01-01 PROCEDURE — 96372 THER/PROPH/DIAG INJ SC/IM: CPT

## 2017-01-01 PROCEDURE — 1160F RVW MEDS BY RX/DR IN RCRD: CPT | Mod: S$GLB,,, | Performed by: NURSE PRACTITIONER

## 2017-01-01 PROCEDURE — 99223 1ST HOSP IP/OBS HIGH 75: CPT | Mod: ,,, | Performed by: HOSPITALIST

## 2017-01-01 PROCEDURE — 87046 STOOL CULTR AEROBIC BACT EA: CPT

## 2017-01-01 PROCEDURE — 25000003 PHARM REV CODE 250

## 2017-01-01 PROCEDURE — 82436 ASSAY OF URINE CHLORIDE: CPT

## 2017-01-01 PROCEDURE — 27200188 HC TRANSDUCER, ART ADULT/PEDS

## 2017-01-01 PROCEDURE — 85014 HEMATOCRIT: CPT

## 2017-01-01 PROCEDURE — 80048 BASIC METABOLIC PNL TOTAL CA: CPT | Mod: 91

## 2017-01-01 PROCEDURE — 87209 SMEAR COMPLEX STAIN: CPT

## 2017-01-01 PROCEDURE — 36430 TRANSFUSION BLD/BLD COMPNT: CPT

## 2017-01-01 PROCEDURE — 82140 ASSAY OF AMMONIA: CPT

## 2017-01-01 PROCEDURE — 93010 ELECTROCARDIOGRAM REPORT: CPT | Mod: ,,, | Performed by: INTERNAL MEDICINE

## 2017-01-01 PROCEDURE — 84133 ASSAY OF URINE POTASSIUM: CPT

## 2017-01-01 PROCEDURE — 02HV33Z INSERTION OF INFUSION DEVICE INTO SUPERIOR VENA CAVA, PERCUTANEOUS APPROACH: ICD-10-PCS | Performed by: INTERNAL MEDICINE

## 2017-01-01 PROCEDURE — 85610 PROTHROMBIN TIME: CPT | Mod: 91

## 2017-01-01 PROCEDURE — 83735 ASSAY OF MAGNESIUM: CPT

## 2017-01-01 PROCEDURE — 83010 ASSAY OF HAPTOGLOBIN QUANT: CPT

## 2017-01-01 PROCEDURE — 36556 INSERT NON-TUNNEL CV CATH: CPT

## 2017-01-01 PROCEDURE — 87449 NOS EACH ORGANISM AG IA: CPT

## 2017-01-01 PROCEDURE — 11000001 HC ACUTE MED/SURG PRIVATE ROOM

## 2017-01-01 PROCEDURE — 83605 ASSAY OF LACTIC ACID: CPT

## 2017-01-01 PROCEDURE — 86965 POOLING BLOOD PLATELETS: CPT

## 2017-01-01 PROCEDURE — 83615 LACTATE (LD) (LDH) ENZYME: CPT

## 2017-01-01 PROCEDURE — 63600175 PHARM REV CODE 636 W HCPCS

## 2017-01-01 PROCEDURE — 96360 HYDRATION IV INFUSION INIT: CPT

## 2017-01-01 PROCEDURE — 86920 COMPATIBILITY TEST SPIN: CPT

## 2017-01-01 PROCEDURE — 99283 EMERGENCY DEPT VISIT LOW MDM: CPT | Mod: 25

## 2017-01-01 PROCEDURE — 84540 ASSAY OF URINE/UREA-N: CPT

## 2017-01-01 PROCEDURE — 84300 ASSAY OF URINE SODIUM: CPT

## 2017-01-01 PROCEDURE — 84145 PROCALCITONIN (PCT): CPT

## 2017-01-01 PROCEDURE — 90945 DIALYSIS ONE EVALUATION: CPT

## 2017-01-01 PROCEDURE — 31500 INSERT EMERGENCY AIRWAY: CPT

## 2017-01-01 PROCEDURE — 99223 1ST HOSP IP/OBS HIGH 75: CPT | Mod: ,,, | Performed by: INTERNAL MEDICINE

## 2017-01-01 PROCEDURE — 1159F MED LIST DOCD IN RCRD: CPT | Mod: S$GLB,,, | Performed by: NURSE PRACTITIONER

## 2017-01-01 PROCEDURE — 99285 EMERGENCY DEPT VISIT HI MDM: CPT | Mod: 25

## 2017-01-01 PROCEDURE — 1157F ADVNC CARE PLAN IN RCRD: CPT | Mod: S$GLB,,, | Performed by: NURSE PRACTITIONER

## 2017-01-01 PROCEDURE — 85384 FIBRINOGEN ACTIVITY: CPT

## 2017-01-01 PROCEDURE — 12000002 HC ACUTE/MED SURGE SEMI-PRIVATE ROOM

## 2017-01-01 PROCEDURE — 89055 LEUKOCYTE ASSESSMENT FECAL: CPT

## 2017-01-01 PROCEDURE — 99232 SBSQ HOSP IP/OBS MODERATE 35: CPT | Mod: ,,, | Performed by: INTERNAL MEDICINE

## 2017-01-01 PROCEDURE — 99285 EMERGENCY DEPT VISIT HI MDM: CPT | Mod: ,,, | Performed by: EMERGENCY MEDICINE

## 2017-01-01 PROCEDURE — 86901 BLOOD TYPING SEROLOGIC RH(D): CPT

## 2017-01-01 PROCEDURE — 96376 TX/PRO/DX INJ SAME DRUG ADON: CPT

## 2017-01-01 PROCEDURE — 94003 VENT MGMT INPAT SUBQ DAY: CPT

## 2017-01-01 PROCEDURE — 31500 INSERT EMERGENCY AIRWAY: CPT | Mod: 59,,, | Performed by: INTERNAL MEDICINE

## 2017-01-01 PROCEDURE — 43752 NASAL/OROGASTRIC W/TUBE PLMT: CPT

## 2017-01-01 PROCEDURE — 87086 URINE CULTURE/COLONY COUNT: CPT

## 2017-01-01 PROCEDURE — 3078F DIAST BP <80 MM HG: CPT | Mod: S$GLB,,, | Performed by: NURSE PRACTITIONER

## 2017-01-01 PROCEDURE — 99214 OFFICE O/P EST MOD 30 MIN: CPT | Mod: S$GLB,,, | Performed by: NURSE PRACTITIONER

## 2017-01-01 PROCEDURE — 99223 1ST HOSP IP/OBS HIGH 75: CPT | Mod: ,,, | Performed by: PSYCHIATRY & NEUROLOGY

## 2017-01-01 PROCEDURE — 99253 IP/OBS CNSLTJ NEW/EST LOW 45: CPT | Mod: ,,, | Performed by: INTERNAL MEDICINE

## 2017-01-01 PROCEDURE — 80202 ASSAY OF VANCOMYCIN: CPT

## 2017-01-01 PROCEDURE — 93005 ELECTROCARDIOGRAM TRACING: CPT

## 2017-01-01 PROCEDURE — 84999 UNLISTED CHEMISTRY PROCEDURE: CPT | Mod: 91

## 2017-01-01 PROCEDURE — 87040 BLOOD CULTURE FOR BACTERIA: CPT

## 2017-01-01 PROCEDURE — 99284 EMERGENCY DEPT VISIT MOD MDM: CPT | Mod: ,,, | Performed by: EMERGENCY MEDICINE

## 2017-01-01 RX ORDER — ETOMIDATE 2 MG/ML
INJECTION INTRAVENOUS
Status: COMPLETED
Start: 2017-01-01 | End: 2017-01-01

## 2017-01-01 RX ORDER — RAMELTEON 8 MG/1
8 TABLET ORAL NIGHTLY PRN
Status: DISCONTINUED | OUTPATIENT
Start: 2017-01-01 | End: 2017-01-01

## 2017-01-01 RX ORDER — LORAZEPAM 2 MG/ML
1 INJECTION INTRAMUSCULAR EVERY 10 MIN PRN
Status: DISCONTINUED | OUTPATIENT
Start: 2017-01-01 | End: 2017-01-01 | Stop reason: HOSPADM

## 2017-01-01 RX ORDER — FENTANYL CITRATE 50 UG/ML
INJECTION, SOLUTION INTRAMUSCULAR; INTRAVENOUS CODE/TRAUMA/SEDATION MEDICATION
Status: COMPLETED | OUTPATIENT
Start: 2017-01-01 | End: 2017-01-01

## 2017-01-01 RX ORDER — OXYCODONE HYDROCHLORIDE 5 MG/1
5 TABLET ORAL EVERY 6 HOURS PRN
Status: DISCONTINUED | OUTPATIENT
Start: 2017-01-01 | End: 2017-01-01

## 2017-01-01 RX ORDER — HYDROCODONE BITARTRATE AND ACETAMINOPHEN 500; 5 MG/1; MG/1
TABLET ORAL
Status: DISCONTINUED | OUTPATIENT
Start: 2017-01-01 | End: 2017-01-01

## 2017-01-01 RX ORDER — OXYMETAZOLINE HCL 0.05 %
2 SPRAY, NON-AEROSOL (ML) NASAL 2 TIMES DAILY
Status: DISCONTINUED | OUTPATIENT
Start: 2017-01-01 | End: 2017-01-01

## 2017-01-01 RX ORDER — DEXMEDETOMIDINE HYDROCHLORIDE 4 UG/ML
0.2 INJECTION, SOLUTION INTRAVENOUS CONTINUOUS
Status: DISCONTINUED | OUTPATIENT
Start: 2017-01-01 | End: 2017-01-01

## 2017-01-01 RX ORDER — MORPHINE SULFATE 2 MG/ML
4 INJECTION, SOLUTION INTRAMUSCULAR; INTRAVENOUS ONCE
Status: COMPLETED | OUTPATIENT
Start: 2017-01-01 | End: 2017-01-01

## 2017-01-01 RX ORDER — MIDODRINE HYDROCHLORIDE 5 MG/1
10 TABLET ORAL ONCE
Status: COMPLETED | OUTPATIENT
Start: 2017-01-01 | End: 2017-01-01

## 2017-01-01 RX ORDER — PANTOPRAZOLE SODIUM 40 MG/1
40 FOR SUSPENSION ORAL DAILY
Status: DISCONTINUED | OUTPATIENT
Start: 2017-01-01 | End: 2017-01-01

## 2017-01-01 RX ORDER — FUROSEMIDE 40 MG/1
40 TABLET ORAL DAILY
Status: ON HOLD | COMMUNITY
End: 2017-01-01

## 2017-01-01 RX ORDER — LACTULOSE 10 G/15ML
20 SOLUTION ORAL EVERY 4 HOURS
Status: DISCONTINUED | OUTPATIENT
Start: 2017-01-01 | End: 2017-01-01

## 2017-01-01 RX ORDER — PANTOPRAZOLE SODIUM 40 MG/1
40 TABLET, DELAYED RELEASE ORAL DAILY
Status: DISCONTINUED | OUTPATIENT
Start: 2017-01-01 | End: 2017-01-01

## 2017-01-01 RX ORDER — ALBUMIN HUMAN 250 G/1000ML
25 SOLUTION INTRAVENOUS EVERY 6 HOURS
Status: DISCONTINUED | OUTPATIENT
Start: 2017-01-01 | End: 2017-01-01

## 2017-01-01 RX ORDER — LORAZEPAM 2 MG/ML
INJECTION INTRAMUSCULAR
Status: COMPLETED
Start: 2017-01-01 | End: 2017-01-01

## 2017-01-01 RX ORDER — HEPARIN SODIUM 1000 [USP'U]/ML
INJECTION, SOLUTION INTRAVENOUS; SUBCUTANEOUS CODE/TRAUMA/SEDATION MEDICATION
Status: COMPLETED | OUTPATIENT
Start: 2017-01-01 | End: 2017-01-01

## 2017-01-01 RX ORDER — NOREPINEPHRINE BITARTRATE/D5W 4MG/250ML
PLASTIC BAG, INJECTION (ML) INTRAVENOUS
Status: COMPLETED
Start: 2017-01-01 | End: 2017-01-01

## 2017-01-01 RX ORDER — SPIRONOLACTONE 25 MG/1
25 TABLET ORAL DAILY
COMMUNITY

## 2017-01-01 RX ORDER — MAGNESIUM SULFATE HEPTAHYDRATE 40 MG/ML
2 INJECTION, SOLUTION INTRAVENOUS
Status: DISCONTINUED | OUTPATIENT
Start: 2017-01-01 | End: 2017-01-01 | Stop reason: HOSPADM

## 2017-01-01 RX ORDER — ONDANSETRON 4 MG/1
4 TABLET, FILM COATED ORAL EVERY 6 HOURS PRN
Qty: 28 TABLET | Refills: 0 | Status: ON HOLD | OUTPATIENT
Start: 2017-01-01 | End: 2017-01-01

## 2017-01-01 RX ORDER — MAGNESIUM SULFATE HEPTAHYDRATE 40 MG/ML
2 INJECTION, SOLUTION INTRAVENOUS
Status: DISCONTINUED | OUTPATIENT
Start: 2017-01-01 | End: 2017-01-01

## 2017-01-01 RX ORDER — AMOXICILLIN AND CLAVULANATE POTASSIUM 500; 125 MG/1; MG/1
1 TABLET, FILM COATED ORAL 2 TIMES DAILY
Qty: 14 TABLET | Refills: 0 | Status: SHIPPED | OUTPATIENT
Start: 2017-01-01 | End: 2017-01-01

## 2017-01-01 RX ORDER — IBUPROFEN 200 MG
16 TABLET ORAL
Status: DISCONTINUED | OUTPATIENT
Start: 2017-01-01 | End: 2017-01-01

## 2017-01-01 RX ORDER — HYDROCODONE BITARTRATE AND ACETAMINOPHEN 500; 5 MG/1; MG/1
TABLET ORAL
Status: DISCONTINUED | OUTPATIENT
Start: 2017-01-01 | End: 2017-01-01 | Stop reason: HOSPADM

## 2017-01-01 RX ORDER — ROCURONIUM BROMIDE 10 MG/ML
INJECTION, SOLUTION INTRAVENOUS
Status: COMPLETED
Start: 2017-01-01 | End: 2017-01-01

## 2017-01-01 RX ORDER — IBUPROFEN 200 MG
24 TABLET ORAL
Status: DISCONTINUED | OUTPATIENT
Start: 2017-01-01 | End: 2017-01-01

## 2017-01-01 RX ORDER — MIDAZOLAM HYDROCHLORIDE 1 MG/ML
INJECTION, SOLUTION INTRAMUSCULAR; INTRAVENOUS CODE/TRAUMA/SEDATION MEDICATION
Status: COMPLETED | OUTPATIENT
Start: 2017-01-01 | End: 2017-01-01

## 2017-01-01 RX ORDER — SUCCINYLCHOLINE CHLORIDE 20 MG/ML
INJECTION INTRAMUSCULAR; INTRAVENOUS
Status: COMPLETED
Start: 2017-01-01 | End: 2017-01-01

## 2017-01-01 RX ORDER — DIPHENHYDRAMINE HYDROCHLORIDE 50 MG/ML
50 INJECTION INTRAMUSCULAR; INTRAVENOUS ONCE
Status: COMPLETED | OUTPATIENT
Start: 2017-01-01 | End: 2017-01-01

## 2017-01-01 RX ORDER — SODIUM BICARBONATE 650 MG/1
1300 TABLET ORAL 3 TIMES DAILY
Status: DISCONTINUED | OUTPATIENT
Start: 2017-01-01 | End: 2017-01-01

## 2017-01-01 RX ORDER — MIDODRINE HYDROCHLORIDE 5 MG/1
10 TABLET ORAL 3 TIMES DAILY
Status: DISCONTINUED | OUTPATIENT
Start: 2017-01-01 | End: 2017-01-01

## 2017-01-01 RX ORDER — HEPARIN SODIUM 5000 [USP'U]/ML
5000 INJECTION, SOLUTION INTRAVENOUS; SUBCUTANEOUS EVERY 8 HOURS
Status: DISCONTINUED | OUTPATIENT
Start: 2017-01-01 | End: 2017-01-01

## 2017-01-01 RX ORDER — ONDANSETRON 4 MG/1
4 TABLET, FILM COATED ORAL EVERY 6 HOURS PRN
Status: DISCONTINUED | OUTPATIENT
Start: 2017-01-01 | End: 2017-01-01

## 2017-01-01 RX ORDER — OCTREOTIDE ACETATE 100 UG/ML
50 INJECTION, SOLUTION INTRAVENOUS; SUBCUTANEOUS
Status: COMPLETED | OUTPATIENT
Start: 2017-01-01 | End: 2017-01-01

## 2017-01-01 RX ORDER — PROPOFOL 10 MG/ML
INJECTION, EMULSION INTRAVENOUS
Status: COMPLETED
Start: 2017-01-01 | End: 2017-01-01

## 2017-01-01 RX ORDER — SODIUM CHLORIDE 9 MG/ML
INJECTION, SOLUTION INTRAVENOUS CONTINUOUS
Status: DISCONTINUED | OUTPATIENT
Start: 2017-01-01 | End: 2017-01-01 | Stop reason: HOSPADM

## 2017-01-01 RX ORDER — LIDOCAINE HYDROCHLORIDE 10 MG/ML
1 INJECTION, SOLUTION EPIDURAL; INFILTRATION; INTRACAUDAL; PERINEURAL ONCE
Status: COMPLETED | OUTPATIENT
Start: 2017-01-01 | End: 2017-01-01

## 2017-01-01 RX ORDER — FENTANYL CITRAT/DEXTROSE 5%/PF 100 MCG/10
12.5 PATIENT CONTROLLED ANALGESIA SYRINGE INTRAVENOUS CONTINUOUS
Status: DISCONTINUED | OUTPATIENT
Start: 2017-01-01 | End: 2017-01-01

## 2017-01-01 RX ORDER — PROPOFOL 10 MG/ML
50 INJECTION, EMULSION INTRAVENOUS CONTINUOUS
Status: DISCONTINUED | OUTPATIENT
Start: 2017-01-01 | End: 2017-01-01 | Stop reason: HOSPADM

## 2017-01-01 RX ORDER — HEPARIN SODIUM 5000 [USP'U]/ML
5000 INJECTION, SOLUTION INTRAVENOUS; SUBCUTANEOUS EVERY 12 HOURS
Status: DISCONTINUED | OUTPATIENT
Start: 2017-01-01 | End: 2017-01-01

## 2017-01-01 RX ORDER — MORPHINE SULFATE 2 MG/ML
2 INJECTION, SOLUTION INTRAMUSCULAR; INTRAVENOUS
Status: DISCONTINUED | OUTPATIENT
Start: 2017-01-01 | End: 2017-01-01 | Stop reason: HOSPADM

## 2017-01-01 RX ORDER — NOREPINEPHRINE BITARTRATE/D5W 4MG/250ML
0.05 PLASTIC BAG, INJECTION (ML) INTRAVENOUS CONTINUOUS
Status: DISCONTINUED | OUTPATIENT
Start: 2017-01-01 | End: 2017-01-01

## 2017-01-01 RX ORDER — ALBUMIN HUMAN 250 G/1000ML
25 SOLUTION INTRAVENOUS EVERY 8 HOURS
Status: DISCONTINUED | OUTPATIENT
Start: 2017-01-01 | End: 2017-01-01

## 2017-01-01 RX ORDER — ENOXAPARIN SODIUM 100 MG/ML
40 INJECTION SUBCUTANEOUS EVERY 24 HOURS
Status: DISCONTINUED | OUTPATIENT
Start: 2017-01-01 | End: 2017-01-01 | Stop reason: SDUPTHER

## 2017-01-01 RX ORDER — FENTANYL CITRAT/DEXTROSE 5%/PF 100 MCG/10
12.5 PATIENT CONTROLLED ANALGESIA SYRINGE INTRAVENOUS CONTINUOUS
Status: DISCONTINUED | OUTPATIENT
Start: 2017-01-01 | End: 2017-01-01 | Stop reason: HOSPADM

## 2017-01-01 RX ORDER — ALBUMIN HUMAN 250 G/1000ML
25 SOLUTION INTRAVENOUS ONCE
Status: COMPLETED | OUTPATIENT
Start: 2017-01-01 | End: 2017-01-01

## 2017-01-01 RX ORDER — OCTREOTIDE ACETATE 100 UG/ML
50 INJECTION, SOLUTION INTRAVENOUS; SUBCUTANEOUS EVERY 8 HOURS
Status: DISCONTINUED | OUTPATIENT
Start: 2017-01-01 | End: 2017-01-01

## 2017-01-01 RX ORDER — OXYCODONE HYDROCHLORIDE 5 MG/1
5 TABLET ORAL EVERY 6 HOURS PRN
Qty: 20 TABLET | Refills: 0 | Status: ON HOLD | OUTPATIENT
Start: 2017-01-01 | End: 2017-01-01

## 2017-01-01 RX ORDER — SPIRONOLACTONE 50 MG/1
25 TABLET, FILM COATED ORAL DAILY
Status: ON HOLD | COMMUNITY
End: 2017-01-01

## 2017-01-01 RX ORDER — GLUCAGON 1 MG
1 KIT INJECTION
Status: DISCONTINUED | OUTPATIENT
Start: 2017-01-01 | End: 2017-01-01

## 2017-01-01 RX ADMIN — Medication 150 MCG/HR: at 01:02

## 2017-01-01 RX ADMIN — OXYMETAZOLINE HYDROCHLORIDE 2 SPRAY: 5 SPRAY NASAL at 09:02

## 2017-01-01 RX ADMIN — LACTULOSE 20 G: 10 SOLUTION ORAL at 02:02

## 2017-01-01 RX ADMIN — HYDROCORTISONE SODIUM SUCCINATE 200 MG: 100 INJECTION, POWDER, FOR SOLUTION INTRAMUSCULAR; INTRAVENOUS at 01:01

## 2017-01-01 RX ADMIN — MAGNESIUM SULFATE IN WATER 2 G: 40 INJECTION, SOLUTION INTRAVENOUS at 02:02

## 2017-01-01 RX ADMIN — SODIUM CHLORIDE: 0.9 INJECTION, SOLUTION INTRAVENOUS at 04:02

## 2017-01-01 RX ADMIN — ALBUMIN (HUMAN) 25 G: 12.5 SOLUTION INTRAVENOUS at 05:02

## 2017-01-01 RX ADMIN — SODIUM CHLORIDE: 0.9 INJECTION, SOLUTION INTRAVENOUS at 11:01

## 2017-01-01 RX ADMIN — Medication 0.3 MCG/KG/MIN: at 06:02

## 2017-01-01 RX ADMIN — MAGNESIUM SULFATE IN WATER 2 G: 40 INJECTION, SOLUTION INTRAVENOUS at 06:02

## 2017-01-01 RX ADMIN — OCTREOTIDE ACETATE 50 MCG: 100 INJECTION, SOLUTION INTRAVENOUS; SUBCUTANEOUS at 05:02

## 2017-01-01 RX ADMIN — Medication: at 03:02

## 2017-01-01 RX ADMIN — ALBUMIN (HUMAN) 25 G: 12.5 SOLUTION INTRAVENOUS at 12:02

## 2017-01-01 RX ADMIN — Medication 150 MCG/HR: at 09:02

## 2017-01-01 RX ADMIN — LACTULOSE 20 G: 10 SOLUTION ORAL at 10:02

## 2017-01-01 RX ADMIN — PANTOPRAZOLE SODIUM 40 MG: 40 TABLET, DELAYED RELEASE ORAL at 10:02

## 2017-01-01 RX ADMIN — Medication 0.6 MCG/KG/MIN: at 03:02

## 2017-01-01 RX ADMIN — ALBUMIN (HUMAN) 25 G: 12.5 SOLUTION INTRAVENOUS at 07:02

## 2017-01-01 RX ADMIN — SODIUM CHLORIDE 1000 ML: 0.9 INJECTION, SOLUTION INTRAVENOUS at 05:02

## 2017-01-01 RX ADMIN — ETOMIDATE 15 MG: 2 INJECTION, SOLUTION INTRAVENOUS at 12:02

## 2017-01-01 RX ADMIN — HEPARIN SODIUM 5000 UNITS: 5000 INJECTION, SOLUTION INTRAVENOUS; SUBCUTANEOUS at 10:02

## 2017-01-01 RX ADMIN — PIPERACILLIN AND TAZOBACTAM 4.5 G: 4; .5 INJECTION, POWDER, LYOPHILIZED, FOR SOLUTION INTRAVENOUS; PARENTERAL at 10:02

## 2017-01-01 RX ADMIN — DEXMEDETOMIDINE HYDROCHLORIDE 0.4 MCG/KG/HR: 4 INJECTION, SOLUTION INTRAVENOUS at 12:02

## 2017-01-01 RX ADMIN — Medication 300 MCG/HR: at 12:02

## 2017-01-01 RX ADMIN — MIDODRINE HYDROCHLORIDE 10 MG: 5 TABLET ORAL at 10:02

## 2017-01-01 RX ADMIN — SODIUM CHLORIDE: 0.9 INJECTION, SOLUTION INTRAVENOUS at 03:01

## 2017-01-01 RX ADMIN — Medication 0.31 MCG/KG/MIN: at 03:02

## 2017-01-01 RX ADMIN — OCTREOTIDE ACETATE 50 MCG: 100 INJECTION, SOLUTION INTRAVENOUS; SUBCUTANEOUS at 10:02

## 2017-01-01 RX ADMIN — PHYTONADIONE 10 MG: 10 INJECTION, EMULSION INTRAMUSCULAR; INTRAVENOUS; SUBCUTANEOUS at 07:02

## 2017-01-01 RX ADMIN — SODIUM PHOSPHATE, MONOBASIC, MONOHYDRATE AND SODIUM PHOSPHATE, DIBASIC, ANHYDROUS 39.99 MMOL: 276; 142 INJECTION, SOLUTION INTRAVENOUS at 11:02

## 2017-01-01 RX ADMIN — LACTULOSE 20 G: 10 SOLUTION ORAL at 08:02

## 2017-01-01 RX ADMIN — OCTREOTIDE ACETATE 50 MCG: 100 INJECTION, SOLUTION INTRAVENOUS; SUBCUTANEOUS at 11:02

## 2017-01-01 RX ADMIN — ALBUMIN (HUMAN) 25 G: 12.5 SOLUTION INTRAVENOUS at 01:02

## 2017-01-01 RX ADMIN — DIPHENHYDRAMINE HYDROCHLORIDE 50 MG: 50 INJECTION, SOLUTION INTRAMUSCULAR; INTRAVENOUS at 01:01

## 2017-01-01 RX ADMIN — SODIUM POLYSTYRENE SULFONATE 30 G: 15 SUSPENSION ORAL; RECTAL at 09:01

## 2017-01-01 RX ADMIN — Medication 0.64 MCG/KG/MIN: at 09:02

## 2017-01-01 RX ADMIN — ROCURONIUM BROMIDE 50 MG: 10 INJECTION, SOLUTION INTRAVENOUS at 12:02

## 2017-01-01 RX ADMIN — MIDAZOLAM HYDROCHLORIDE 1 MG: 1 INJECTION, SOLUTION INTRAMUSCULAR; INTRAVENOUS at 03:01

## 2017-01-01 RX ADMIN — Medication 0.64 MCG/KG/MIN: at 05:02

## 2017-01-01 RX ADMIN — SODIUM BICARBONATE 650 MG TABLET 1300 MG: at 10:02

## 2017-01-01 RX ADMIN — LACTULOSE 20 G: 10 SOLUTION ORAL at 03:02

## 2017-01-01 RX ADMIN — LORAZEPAM 1 MG: 2 INJECTION INTRAMUSCULAR; INTRAVENOUS at 02:02

## 2017-01-01 RX ADMIN — LACTULOSE 20 G: 10 SOLUTION ORAL at 06:02

## 2017-01-01 RX ADMIN — SODIUM CHLORIDE 1000 ML: 0.9 INJECTION, SOLUTION INTRAVENOUS at 09:01

## 2017-01-01 RX ADMIN — ALBUMIN (HUMAN) 25 G: 12.5 SOLUTION INTRAVENOUS at 03:02

## 2017-01-01 RX ADMIN — PANTOPRAZOLE SODIUM 40 MG: 40 GRANULE, DELAYED RELEASE ORAL at 12:02

## 2017-01-01 RX ADMIN — ALBUMIN (HUMAN) 25 G: 12.5 SOLUTION INTRAVENOUS at 11:02

## 2017-01-01 RX ADMIN — MORPHINE SULFATE 4 MG: 2 INJECTION, SOLUTION INTRAMUSCULAR; INTRAVENOUS at 11:02

## 2017-01-01 RX ADMIN — HEPARIN SODIUM 3000 UNITS: 1000 INJECTION, SOLUTION INTRAVENOUS; SUBCUTANEOUS at 03:01

## 2017-01-01 RX ADMIN — Medication 0.34 MCG/KG/MIN: at 09:02

## 2017-01-01 RX ADMIN — PANTOPRAZOLE SODIUM 40 MG: 40 GRANULE, DELAYED RELEASE ORAL at 08:02

## 2017-01-01 RX ADMIN — Medication 1000 MG: at 03:02

## 2017-01-01 RX ADMIN — MIDODRINE HYDROCHLORIDE 10 MG: 5 TABLET ORAL at 12:02

## 2017-01-01 RX ADMIN — Medication 0.64 MCG/KG/MIN: at 10:02

## 2017-01-01 RX ADMIN — IOHEXOL 40 ML: 300 INJECTION, SOLUTION INTRAVENOUS at 03:01

## 2017-01-01 RX ADMIN — VANCOMYCIN HYDROCHLORIDE 1250 MG: 1 INJECTION, POWDER, LYOPHILIZED, FOR SOLUTION INTRAVENOUS at 03:02

## 2017-01-01 RX ADMIN — Medication 0.64 MCG/KG/MIN: at 12:02

## 2017-01-01 RX ADMIN — FENTANYL CITRATE 50 MCG: 50 INJECTION, SOLUTION INTRAMUSCULAR; INTRAVENOUS at 03:01

## 2017-01-01 RX ADMIN — MIDODRINE HYDROCHLORIDE 10 MG: 5 TABLET ORAL at 11:02

## 2017-01-01 RX ADMIN — OCTREOTIDE ACETATE 50 MCG: 100 INJECTION, SOLUTION INTRAVENOUS; SUBCUTANEOUS at 06:02

## 2017-01-01 RX ADMIN — NOREPINEPHRINE BITARTRATE 0.64 MCG/KG/MIN: 1 INJECTION, SOLUTION, CONCENTRATE INTRAVENOUS at 04:02

## 2017-01-01 RX ADMIN — MORPHINE SULFATE 5 MG/HR: 10 INJECTION INTRAMUSCULAR; INTRAVENOUS; SUBCUTANEOUS at 11:02

## 2017-01-01 RX ADMIN — PANTOPRAZOLE SODIUM 40 MG: 40 GRANULE, DELAYED RELEASE ORAL at 09:02

## 2017-01-01 RX ADMIN — PROPOFOL 50 MCG/KG/MIN: 10 INJECTION, EMULSION INTRAVENOUS at 07:02

## 2017-01-01 RX ADMIN — PHYTONADIONE 10 MG: 10 INJECTION, EMULSION INTRAMUSCULAR; INTRAVENOUS; SUBCUTANEOUS at 08:02

## 2017-01-01 RX ADMIN — SODIUM PHOSPHATE, MONOBASIC, MONOHYDRATE 30 MMOL: 276; 142 INJECTION, SOLUTION INTRAVENOUS at 11:02

## 2017-01-01 RX ADMIN — LIDOCAINE HYDROCHLORIDE 0.2 MG: 10 INJECTION, SOLUTION EPIDURAL; INFILTRATION; INTRACAUDAL; PERINEURAL at 11:01

## 2017-01-01 RX ADMIN — Medication 100 MCG/HR: at 06:02

## 2017-01-01 RX ADMIN — Medication 0.64 MCG/KG/MIN: at 07:02

## 2017-01-01 RX ADMIN — LACTULOSE 20 G: 10 SOLUTION ORAL at 05:02

## 2017-01-01 RX ADMIN — AMPICILLIN SODIUM AND SULBACTAM SODIUM 3 G: 2; 1 INJECTION, POWDER, FOR SOLUTION INTRAMUSCULAR; INTRAVENOUS at 01:01

## 2017-01-01 RX ADMIN — LORAZEPAM 1 MG: 2 INJECTION INTRAMUSCULAR; INTRAVENOUS at 11:02

## 2017-01-01 RX ADMIN — PHYTONADIONE 10 MG: 10 INJECTION, EMULSION INTRAMUSCULAR; INTRAVENOUS; SUBCUTANEOUS at 09:02

## 2017-01-01 RX ADMIN — OCTREOTIDE ACETATE 50 MCG: 100 INJECTION, SOLUTION INTRAVENOUS; SUBCUTANEOUS at 03:02

## 2017-01-01 RX ADMIN — SODIUM CHLORIDE 500 ML: 0.9 INJECTION, SOLUTION INTRAVENOUS at 11:02

## 2017-01-01 RX ADMIN — LORAZEPAM 2 MG: 2 INJECTION INTRAMUSCULAR; INTRAVENOUS at 08:02

## 2017-01-01 RX ADMIN — Medication 300 MCG/HR: at 07:02

## 2017-01-01 RX ADMIN — ALBUMIN (HUMAN) 25 G: 12.5 SOLUTION INTRAVENOUS at 06:02

## 2017-01-01 RX ADMIN — Medication 0.31 MCG/KG/MIN: at 07:02

## 2017-01-01 RX ADMIN — Medication 0.3 MCG/KG/MIN: at 08:02

## 2017-01-01 RX ADMIN — Medication 0.54 MCG/KG/MIN: at 12:02

## 2017-01-01 RX ADMIN — OCTREOTIDE ACETATE 50 MCG: 100 INJECTION, SOLUTION INTRAVENOUS; SUBCUTANEOUS at 12:02

## 2017-01-01 RX ADMIN — Medication 0.31 MCG/KG/MIN: at 12:02

## 2017-01-01 RX ADMIN — SODIUM BICARBONATE: 84 INJECTION, SOLUTION INTRAVENOUS at 02:02

## 2017-01-01 RX ADMIN — LACTULOSE 20 G: 10 SOLUTION ORAL at 12:02

## 2017-01-16 PROBLEM — E87.5 HYPERKALEMIA: Status: ACTIVE | Noted: 2017-01-01

## 2017-01-16 PROBLEM — N17.9 ACUTE RENAL FAILURE: Status: ACTIVE | Noted: 2017-01-01

## 2017-01-16 NOTE — ED AVS SNAPSHOT
[facility]  [address] [facility] [place] [address]               Danilo Tapia Nikki   2017  8:17 PM   ED    Descripción:  Male : 1945   Departamento:  [facility]           Nieves Cuidado fue coordinado por:     Provider Role From To    Bety Daniel MD Attending Provider 17 --      Razón de la eran     Hyperkalemia           Diagnósticos de Esta Visita        Comentarios    Hyperkalemia    -  Primario       ED Disposition     Ninguna           Lista de tareas           Información de seguimiento     Realice un seguimiento con:  Johnathan Pink MD    Cómo:  Yasmeen ramone eran lo antes posible    Especialidad:  Hematology and Oncology    Información de contacto:    [address] [facility] [place] [address]  [phone]        [facility]     [facility] - Asistencia   Enfermeras registradas de [facility] pueden ayudarle a reservar ramone eran, proveer educación para la antonino, asesoría clínica, y otros servicios de asesoramiento.   Llame para lee servicio gratuito a [phone].             Medicamentos           Mensaje sobre Medicamentos     Verificar los cambios y / o adiciones a nieves régimen de medicación son los mismos que discutir con nieves médico. Si cualquiera de estos cambios o adiciones son incorrectos, por favor notifique a nieves proveedor de atención médica.        These medications were administered today        Dose Freq    sodium chloride 0.9% bolus 1,000 mL 1,000 mL ED 1 Time    Sig: Inject 1,000 mLs into the vein ED 1 Time.    Categoría: Normal    Vía: Intravenous    sodium polystyrene 15 gram/60 mL suspension 30 g 30 g ED 1 Time    Sig: Take 120 mLs (30 g total) by mouth ED 1 Time.    Categoría: Normal    Vía: Oral           Verifique que la siguiente lista de medicamentos es ramone representación exacta de los medicamentos que está tomando actualmente. Si no hay ningunos reportados, la lista  "puede estar en souza. Si no es correcta, por favor póngase en contacto con bustamante proveedor de atención médica. Lleve esta lista con usted en casey de emergencia.           Medicamentos Actuales     furosemide (LASIX) 20 MG tablet Take 3 tablets (60 mg total) by mouth once daily.    losartan (COZAAR) 50 MG tablet Take 50 mg by mouth once daily.    omeprazole (PRILOSEC) 10 MG capsule Take 10 mg by mouth once daily.    SORAFENIB TOSYLATE (NEXAVAR ORAL) Take by mouth.    spironolactone (ALDACTONE) 50 MG tablet Take 50 mg by mouth 2 (two) times daily.           Información de referencia clínica           Esme signos vitales jonathan     PS Pulso Temperatura Resp [employer]    102/60 84 97.3 °F (36.3 °C) (Oral) 15 5' 6" (1.676 m) 63 kg (138 lb 14.2 oz)    SpO2 [place]                98% 22.42 kg/m2          Alergias     A partir del:  1/16/2017        No Known Allergies      Vacunas     Administradas en la fecha de la visita:  1/16/2017        None      ED Micro, Lab, POCT     Start Ordered       Status Ordering Provider    01/16/17 2135 01/16/17 2134  ISTAT CHEM8  Once      Acknowledged       ED Imaging Orders     None        Instrucciones a leyla de sima         Instrucciones de sima para la hiperkalemia  A usted le valdivia diagnosticado hiperkalemia (un nivel alto de potasio en la fahad). El potasio es importante para el funcionamiento de las células nerviosas y musculares, incluyendo las células del corazón, tye un alto nivel de potasio en la fahad puede causar ritmos cardíacos anormales e incluso ataques cardíacos.  Cambios en la dieta  · Reduzca bustamante consumo de los siguientes alimentos ricos en potasio:  ¨ Bananas (evítelas completamente)  ¨ Albaricoques (chabacanos) frescos o secos  ¨ Naranjas y jugo de naranja  ¨ Tomates, salsa de tomate y jugo de tomate  ¨ Espinacas  ¨ Verduras de hojas verdes, adriel ensaladas verdes, acelgas y berzas  ¨ Melones (de cualquier tipo)  ¨ Chícharos " (arvejas)  ¨ Frijoles  ¨ Celine  ¨ Batatas  ¨ Aguacates y guacamole  ¨ Jugo de verduras (nicanor sea hecho en casa o comprado) y mezclas de jugos de verduras  ¨ Jugos de frutas  Otros cuidados en la casa  · Informe al médico sobre todos los medicamentos con o sin receta que esté tomando. Algunos medicamentos pueden aumentar los niveles de potasio.  · [place] los medicamentos exactamente según las indicaciones.  · Yasmeen que le revisen bustamante nivel de potasio regularmente.  · Asista a todas las visitas de control. El médico deberá vigilar de cerca bustamante trastorno.  · Aprenda a tomarse el pulso. Si bustamante pulso es inferior a 60 latidos por minuto o si es irregular, llame al médico.  Visitas de control  Programe ramone visita de control según le indique el personal médico.  Cuándo debe llamar al médico  Llame al médico inmediatamente si presenta cualquiera de los siguientes síntomas:  · Dolor en el pecho (llame al 911)  · Desmayos (llame al 911)  · Falta de aliento o dificultad para respirar (llame al 911 si es grave)  · Ritmo cardíaco demasiado lento o irregular  · Fatiga  · Mareos  · Aturdimiento  · Confusión         © 6261-3570 [employer]. [address] [place] [address]. Todos los derechos reservados. Esta información no pretende sustituir la atención médica profesional. Sólo bustamante médico puede diagnosticar y tratar un problema de antonino.          Esme Citas Programadas     Jan 17, 2017  7:10 AM CST   Non-Fasting Lab with LAB, [place] [facility]    [address] [facility] [place] [address]   [phone]            Jan 17, 2017  8:00 AM CST   Established Patient Visit with [facility] INTERVENTIONAL RADIOLOGY   [facility] - Interventional Rad ([facility] )    [address]  [place] [address]   [phone]            Jan 18, 2017  7:30 AM CST   IR EMBOLIZATION with [facility] [id number]   [facility]    [address]  [facility] [place] [address] [phone]              Esme cirugías futuras / Procedimientos     2017   Surgery with [facility] Provider   [facility])    [address] [place] [address] [phone]              Smoking Cessation     Si desea dejar de fumar:  · Usted puede ser elegible para recibir servicios gratuitos si usted es un residente de [place] y comenzó a fumar cigarrillos antes del 1988. Llame al Smoking Cessation Trust (SCT) a [phone] o [phone].   Llame 1-800-QUIT-NOW si usted no cumple con los criterios anteriores.             [facility] cumple con las leyes federales aplicables de derechos civiles y no discrimina por motivos de vanessa, color, origen nacional, edad, discapacidad, o sexo.        Language Assistance Services     ATTENTION: Language assistance services are available, free of charge. Please call [phone].      ATENCIÓN: Si habla español, tiene a bustamante disposición servicios gratuitos de asistencia lingüística. Llame al [phone].     CHÚ Ý: N?u b?n nói Ti?ng Vi?t, có các d?ch v? h? tr? ngôn ng? mi?n phí dành cho b?n. G?i s? [phone].                      [facility]  [address] [place] [address]               Danilo Jordan   2017  8:17 PM   ED    Description:  Male : 1945   Department:  [facility]           Your Care was Coordinated By:     Provider Role From To    Bety Daniel MD Attending Provider 17 --      Reason for Visit     Hyperkalemia           Diagnoses this Visit        Comments    Hyperkalemia    -  Primary       ED Disposition     None           To Do List           Follow-up Information     Schedule an appointment as soon as possible for a visit with Johnathan Pink MD.    Specialty:  Hematology and Oncology    Contact  "information:    [address]  [facility] [address]  [phone]        [facility] On Call     [facility] On Call Nurse Care Line - 24/7 Assistance  Registered nurses in the [facility] provide clinical advisement, health education, appointment booking, and other advisory services.  Call for this free service at [phone].             Medications           Message regarding Medications     Verify the changes and/or additions to your medication regime listed below are the same as discussed with your clinician today.  If any of these changes or additions are incorrect, please notify your healthcare provider.        These medications were administered today        Dose Freq    sodium chloride 0.9% bolus 1,000 mL 1,000 mL ED 1 Time    Sig: Inject 1,000 mLs into the vein ED 1 Time.    Class: Normal    Route: Intravenous    sodium polystyrene 15 gram/60 mL suspension 30 g 30 g ED 1 Time    Sig: Take 120 mLs (30 g total) by mouth ED 1 Time.    Class: Normal    Route: Oral           Verify that the below list of medications is an accurate representation of the medications you are currently taking.  If none reported, the list may be blank. If incorrect, please contact your healthcare provider. Carry this list with you in case of emergency.           Current Medications     furosemide (LASIX) 20 MG tablet Take 3 tablets (60 mg total) by mouth once daily.    losartan (COZAAR) 50 MG tablet Take 50 mg by mouth once daily.    omeprazole (PRILOSEC) 10 MG capsule Take 10 mg by mouth once daily.    SORAFENIB TOSYLATE (NEXAVAR ORAL) Take by mouth.    spironolactone (ALDACTONE) 50 MG tablet Take 50 mg by mouth 2 (two) times daily.           Clinical Reference Information           Your Vitals Were     BP Pulse Temp Resp Height Weight    102/60 84 97.3 °F (36.3 °C) (Oral) 15 5' 6" (1.676 m) 63 kg (138 lb 14.2 oz)    SpO2 BMI                98% 22.42 kg/m2          Allergies as of 1/16/2017     No Known Allergies    "   Immunizations Administered on Date of Encounter - 1/16/2017     None      ED Micro, Lab, POCT     Start Ordered       Status Ordering Provider    01/16/17 2135 01/16/17 2134  ISTAT CHEM8  Once      Acknowledged       ED Imaging Orders     None        Discharge Instructions         Instrucciones de sima para la hiperkalemia  A usted le valdivia diagnosticado hiperkalemia (un nivel alto de potasio en la fahad). El potasio es importante para el funcionamiento de las células nerviosas y musculares, incluyendo las células del corazón, tye un alto nivel de potasio en la fahad puede causar ritmos cardíacos anormales e incluso ataques cardíacos.  Cambios en la dieta  · Reduzca bustamante consumo de los siguientes alimentos ricos en potasio:  ¨ Bananas (evítelas completamente)  ¨ Albaricoques (chabacanos) frescos o secos  ¨ Naranjas y jugo de naranja  ¨ Tomates, salsa de tomate y jugo de tomate  ¨ Espinacas  ¨ Verduras de hojas verdes, adriel ensaladas verdes, acelgas y berzas  ¨ Melones (de cualquier tipo)  ¨ Chícharos (arvejas)  ¨ Frijoles  ¨ Celine  ¨ Batatas  ¨ Aguacates y guacamole  ¨ Jugo de verduras (nicanor sea hecho en casa o comprado) y mezclas de jugos de verduras  ¨ Jugos de frutas  Otros cuidados en la casa  · Informe al médico sobre todos los medicamentos con o sin receta que esté tomando. Algunos medicamentos pueden aumentar los niveles de potasio.  · [place] los medicamentos exactamente según las indicaciones.  · Yasmeen que le revisen bustamante nivel de potasio regularmente.  · Asista a todas las visitas de control. El médico deberá vigilar de cerca bustamante trastorno.  · Aprenda a tomarse el pulso. Si bustamante pulso es inferior a 60 latidos por minuto o si es irregular, llame al médico.  Visitas de control  Programe ramone visita de control según le indique el personal médico.  Cuándo debe llamar al médico  Llame al médico inmediatamente si presenta cualquiera de los siguientes síntomas:  · Dolor en el pecho (llame al 911)  · Desmayos (llame al  911)  · Falta de aliento o dificultad para respirar (llame al 911 si es grave)  · Ritmo cardíaco demasiado lento o irregular  · Fatiga  · Mareos  · Aturdimiento  · Confusión         © 4978-7173 [employer]. [address] [place] [address]. Todos los derechos reservados. Esta información no pretende sustituir la atención médica profesional. Sólo bustamante médico puede diagnosticar y tratar un problema de antonino.          Your Scheduled Appointments     Jan 17, 2017  7:10 AM CST   Non-Fasting Lab with LAB[place] [facility])    [address] [facility] [id number] [phone]            Jan 17, 2017  8:00 AM CST   Established Patient Visit with [facility] INTERVENTIONAL RADIOLOGY   [facility] - Interventional Rad ([facility] )    [address]  [place] [address] [phone]            Jan 18, 2017  7:30 AM CST   IR EMBOLIZATION with [facility] [id number]   [facility])    [address] [facility] [address] [phone]              Your Future Surgeries/Procedures     Jan 18, 2017   Surgery with [facility] Diagnostic Provider   [facility])    [address] [facility] [address] [phone]              Smoking Cessation     If you would like to quit smoking:   You may be eligible for free services if you are a [place] resident and started smoking cigarettes before September 1, 1988.  Call the Smoking Cessation Trust (SCT) toll free at (553) 332-4410 or (572) 737-0163.   Call 1-800-QUIT-NOW if you do not meet the above criteria.             [facility] complies with applicable Federal civil rights laws and does not discriminate on the basis of race, color, national origin, age, disability, or sex.        Language Assistance Services     ATTENTION: Language assistance services are available, free of  charge. Please call [phone].      ATENCIÓN: Si habla español, tiene a bustamante disposición servicios gratuitos de asistencia lingüística. Llame al [phone].     CHÚ Ý: N?u b?n nói Ti?ng Vi?t, có các d?ch v? h? tr? ngôn ng? mi?n phí dành cho b?n. G?i s? [phone].

## 2017-01-16 NOTE — PLAN OF CARE
Problem: Patient Care Overview  Goal: Plan of Care Review  Outcome: Ongoing (interventions implemented as appropriate)  Patient tolerated treatment well. Discharged without complaints or S/S of adverse event. AVS given. Instructed on s/s to report to physician and educated on diet low in K.  Instructed to call provider for any questions or concerns.

## 2017-01-16 NOTE — PROGRESS NOTES
Subjective:       Patient ID: Danilo Jordan is a 71 y.o. male.    Chief Complaint: Follow-up; Hepatocellular Carcinoma; and Cirrhosis    HPI  I saw this 71 y.o. man with cirrhosis and HCC in the hepatology clinic.  He lives in [facility] and was previously seen here by Reina Liz. I last saw him about 1 month ago.    - he is very well and has no symptoms although he does describe some new onset abdominal distension and his imaging shows ascites.    EGD 4/15/16: small esophageal varices    Ascites has responded to diuretics but his creatinine is up today and he was unable to get his CT scan with contrast to assess response to treatment.      Weight down but creatinine is up- CT cancelled  Furosemide 40mg + christina 100 mg daily    CT scan in May 2016:  splenomegaly, esophageal varices  All arterially and hyperenhancing w/ portal phase washout  - Hepatic segment VII laterally lesion measures 5.7 cm.  - Hepatic segment VII medially, lesion measures 4.7 cm  - Hepatic segment V lesion measures 4.1 cm  - Hepatic segment II lesion measures 2.4 cm     Right lobe Yttrium treatment on 6/21/2016.  Left lobe Yttrium treatment on 8/17/2016    Post treatment CT scan: 10/19/2016  1. In this patient with a history of HCC status post Y90 radioembolization, 4 hypodense lesions are identified with persistently enhancing foci as detailed above indicating partial treatment response.    2. Moderate abdominopelvic ascites increased from prior examination.    3. Subcentimeter solitary pulmonary nodule in the right upper lobe as detailed above. Per Fleischner Society guidelines; consider 12 month CT chest follow up to assess for stability 10/2017 or earlier by clinical consideration.    On sorafenib which he seems to be tolerating poorly.    IR conference discussion on 10/25/2016  D/W Dr. Olivier. Adequate response to treatment. Short term f/u with CT or MRI in 1-2 months and discuss again, maybe TACE if persistent  residual.     Further meeting with IR today:  Dr. Olivier discussed results of CT scan regarding his liver. Explained overall favorable response to radioembolization. However, there are still some areas of enhancement which may be either residual tumor or treatment effect. Also discussed concern over worsening of liver function. He recommended repeating CT scan and labs in 1 month. Follow up CT scan and labs scheduled for 1/16/2017.    MELD-Na score: 17 at 1/18/2017  7:22 AM  MELD score: 14 at 1/18/2017  7:22 AM  Calculated from:  Serum Creatinine: 1.3 mg/dL at 1/18/2017  7:22 AM  Serum Sodium: 133 mmol/L at 1/18/2017  7:22 AM  Total Bilirubin: 2.4 mg/dL at 1/18/2017  7:22 AM  INR(ratio): 1.2 at 1/16/2017  8:55 AM  Age: 71 years      Review of Systems   Constitutional: Negative for activity change, appetite change, chills, fatigue, fever and unexpected weight change.   HENT: Negative for hearing loss.    Eyes: Negative for discharge and visual disturbance.   Respiratory: Negative for cough, chest tightness, shortness of breath and wheezing.    Cardiovascular: Negative for chest pain, palpitations and leg swelling.   Gastrointestinal: Negative for abdominal distention, abdominal pain, constipation, diarrhea and nausea.   Genitourinary: Negative for dysuria and frequency.   Musculoskeletal: Negative for arthralgias and back pain.   Skin: Negative for pallor and rash.   Neurological: Negative for dizziness, tremors, speech difficulty and headaches.   Hematological: Negative for adenopathy.   Psychiatric/Behavioral: Negative for agitation and confusion.           Lab Results   Component Value Date    ALT 24 01/18/2017    AST 50 (H) 01/18/2017    ALKPHOS 247 (H) 01/18/2017    BILITOT 2.4 (H) 01/18/2017     Past Medical History   Diagnosis Date    HCC (hepatocellular carcinoma) 5/17/2016    Hypertension      Past Surgical History   Procedure Laterality Date    Rotator cuff repair      Skin graft       Current  Outpatient Prescriptions   Medication Sig    furosemide (LASIX) 20 MG tablet Take 3 tablets (60 mg total) by mouth once daily. (Patient taking differently: Take 40 mg by mouth once daily. Taking 40mg per day)    spironolactone (ALDACTONE) 50 MG tablet Take 50 mg by mouth 2 (two) times daily.    amoxicillin-clavulanate 500-125mg (AUGMENTIN) 500-125 mg Tab Take 1 tablet (500 mg total) by mouth 2 (two) times daily.    losartan (COZAAR) 50 MG tablet Take 50 mg by mouth once daily.    omeprazole (PRILOSEC) 10 MG capsule Take 10 mg by mouth once daily.    ondansetron (ZOFRAN) 4 MG tablet Take 1 tablet (4 mg total) by mouth every 6 (six) hours as needed for Nausea.    oxycodone (ROXICODONE) 5 MG immediate release tablet Take 1 tablet (5 mg total) by mouth every 6 (six) hours as needed for Pain.    SORAFENIB TOSYLATE (NEXAVAR ORAL) Take by mouth.     No current facility-administered medications for this visit.        Objective:      Physical Exam   Constitutional: He is oriented to person, place, and time. He appears well-developed and well-nourished.   HENT:   Head: Normocephalic.   Eyes: Pupils are equal, round, and reactive to light.   Neck: No thyromegaly present.   Cardiovascular: Normal rate, regular rhythm and normal heart sounds.    Pulmonary/Chest: Effort normal and breath sounds normal. He has no wheezes.   Abdominal: Soft. He exhibits no distension and no mass. There is no tenderness.   Lymphadenopathy:     He has no cervical adenopathy.   Neurological: He is alert and oriented to person, place, and time.   Skin: Skin is warm. No rash noted. No erythema.   Psychiatric: He has a normal mood and affect. His behavior is normal.       Assessment:       1. HCC (hepatocellular carcinoma)    2. Ascites of liver    3. Essential hypertension        Plan:   Discussed the diagnosis of cirrhosis and HCC. Discussed that he is not a liver Tx candidate due to extent of tumor.    - Diarrhea x 8- intolerant of  sorafenib-despite drug holiday and restarting at lower dose he still had significant symptoms- stopped it.    - monitoring RUL nodule    Sent to ER because of his high K.  Discussed with Dr Olivier who will do an angiogram (rather than CT) and decide whether to TACE him again.

## 2017-01-16 NOTE — PROGRESS NOTES
"Subjective:       Patient ID: Danilo Jordan is a 71 y.o. male.    Chief Complaint: Hepatocellular Carcinoma; Results (scans/labs); and abdominal distension (started about 2 months ago)    HPI   71 year old male, previous patient of Dr. James, to clinic for follow up for HCC. Pt was placed on Nexavar 200mg BID in  November but continued to have diarrhea so he stopped the medication. Pt was seen in hepatology clinic this am, states he was "unable to do his scheduled CTs due to his labs". Pt reports intermittent swelling to his abdomen and legs- takes Lasix and Aldactone. Reports diarrhea has resolved once stopping Nexavar. He denies pain.    He denies any mouth sores, nausea, vomiting, diarrhea, constipation, abdominal pain, weight loss or loss of appetite, chest pain, shortness of breath, fatigue, pain, headache, dizziness, or mood changes.    Her ECOG PS is one. He lives in [facility]. He is accompanied to clinic alone.    Oncologic History (From Chart):  Per patient he has a long standing history of elevated liver function tests but he has been asymptomatic. Has hepatitis panel checked several times over the years including recently and was negative. On a recent imaging study in [facility] he was found to have liver lesions.  Had CT abdomen and pelvis done at [facility] 5/16/16 which showed at least 4 lesions.  Had Y-90 - right lobe of the liver.  Started on Nexavar 8/2016.    Review of Systems   Constitutional: Negative for activity change, appetite change, fatigue, fever and unexpected weight change.   HENT: Negative for mouth sores, nosebleeds and trouble swallowing.    Eyes: Negative for discharge and visual disturbance.   Respiratory: Negative for cough, shortness of breath and wheezing.    Cardiovascular: Positive for leg swelling. Negative for chest pain.   Gastrointestinal: Positive for abdominal distention (intermittent) and diarrhea (with nexavar). Negative for abdominal pain, blood in stool, " constipation, nausea and vomiting.   Genitourinary: Negative for decreased urine volume, difficulty urinating, frequency and hematuria.   Musculoskeletal: Negative for back pain.   Skin: Negative for color change and rash.   Neurological: Negative for weakness and headaches.   Hematological: Negative for adenopathy.   Psychiatric/Behavioral: Negative for sleep disturbance. The patient is not nervous/anxious.    All other systems reviewed and are negative.      Allergies:  Review of patient's allergies indicates:  No Known Allergies    Medications:  Current Outpatient Prescriptions   Medication Sig Dispense Refill    furosemide (LASIX) 20 MG tablet Take 3 tablets (60 mg total) by mouth once daily. (Patient taking differently: Take 40 mg by mouth once daily. Taking 40mg per day) 90 tablet 3    losartan (COZAAR) 50 MG tablet Take 50 mg by mouth once daily.      omeprazole (PRILOSEC) 10 MG capsule Take 10 mg by mouth once daily.      spironolactone (ALDACTONE) 50 MG tablet Take 50 mg by mouth 2 (two) times daily.      SORAFENIB TOSYLATE (NEXAVAR ORAL) Take by mouth.       No current facility-administered medications for this visit.        PMH:  Past Medical History   Diagnosis Date    HCC (hepatocellular carcinoma) 5/17/2016    Hypertension        PSH:  Past Surgical History   Procedure Laterality Date    Rotator cuff repair      Skin graft         FamHx:  Family History   Problem Relation Age of Onset    No Known Problems Mother     No Known Problems Father     No Known Problems Sister     No Known Problems Brother     No Known Problems Maternal Aunt     No Known Problems Maternal Uncle     No Known Problems Paternal Aunt     No Known Problems Paternal Uncle     No Known Problems Maternal Grandmother     No Known Problems Maternal Grandfather     No Known Problems Paternal Grandmother     No Known Problems Paternal Grandfather     Amblyopia Neg Hx     Blindness Neg Hx     Cancer Neg Hx      Cataracts Neg Hx     Diabetes Neg Hx     Glaucoma Neg Hx     Hypertension Neg Hx     Macular degeneration Neg Hx     Retinal detachment Neg Hx     Strabismus Neg Hx     Stroke Neg Hx     Thyroid disease Neg Hx        SocHx:  Social History     Social History    Marital status:      Spouse name: N/A    Number of children: N/A    Years of education: N/A     Occupational History    Not on file.     Social History Main Topics    Smoking status: Former Smoker    Smokeless tobacco: Not on file    Alcohol use Yes      Comment: socially    Drug use: No    Sexual activity: Not on file     Other Topics Concern    Not on file     Social History Narrative       Objective:      Physical Exam   Constitutional: He is oriented to person, place, and time. He appears well-developed and well-nourished.   HENT:   Head: Normocephalic and atraumatic.   Mouth/Throat: Uvula is midline.   Eyes: Conjunctivae and EOM are normal. Pupils are equal, round, and reactive to light.   Neck: Trachea normal and normal range of motion. Neck supple. No tracheal deviation present.   Cardiovascular: Normal rate, regular rhythm and normal pulses.    Edema noted to bilateral lower extremities.   Pulmonary/Chest: Effort normal and breath sounds normal. No respiratory distress. He has no wheezes. He has no rales.   Abdominal: Soft. Normal appearance and bowel sounds are normal. He exhibits no distension. There is no tenderness. There is no rebound and no guarding.   Musculoskeletal: He exhibits no edema or tenderness.   Lymphadenopathy:     He has no cervical adenopathy.   Neurological: He is alert and oriented to person, place, and time.   Skin: Skin is warm, dry and intact. No rash noted. He is not diaphoretic.   Psychiatric: He has a normal mood and affect. His speech is normal and behavior is normal.   Nursing note and vitals reviewed.      LABS:  WBC   Date Value Ref Range Status   01/16/2017 6.30 3.90 - 12.70 K/uL Final    01/16/2017 6.30 3.90 - 12.70 K/uL Final     Hemoglobin   Date Value Ref Range Status   01/16/2017 13.3 (L) 14.0 - 18.0 g/dL Final   01/16/2017 13.3 (L) 14.0 - 18.0 g/dL Final     Hematocrit   Date Value Ref Range Status   01/16/2017 37.8 (L) 40.0 - 54.0 % Final   01/16/2017 37.8 (L) 40.0 - 54.0 % Final     Platelets   Date Value Ref Range Status   01/16/2017 94 (L) 150 - 350 K/uL Final   01/16/2017 94 (L) 150 - 350 K/uL Final       Chemistry        Component Value Date/Time     (L) 01/16/2017 1644    K 5.7 (H) 01/16/2017 1644     01/16/2017 1644    CO2 18 (L) 01/16/2017 1644    BUN 47 (H) 01/16/2017 1644    CREATININE 1.8 (H) 01/16/2017 1644    GLU 83 01/16/2017 1644        Component Value Date/Time    CALCIUM 8.5 (L) 01/16/2017 1644    ALKPHOS 237 (H) 01/16/2017 0855    AST 46 (H) 01/16/2017 0855    ALT 27 01/16/2017 0855    BILITOT 2.8 (H) 01/16/2017 0855          Assessment:       1. HCC (hepatocellular carcinoma)    2. Ascites of liver    3. Encounter for chemotherapy management    4. Anemia in neoplastic disease    5. Abnormal LFTs (liver function tests)    6. Acute renal failure, unspecified acute renal failure type    7. Hyperkalemia        Plan:       1,2,3- S/p Y-90. Scheduled for TACE on 1/18/17. AFP rising. Discussed restarting Nexavar 200mg BID taken 1 week on and 1 week off. Should he progress, discussed alternative therapies could possibly include immunotherapy. It is a reasonable option although it is not FDA approved for HCC.    Will have pt RTC in 6-8 weeks with labs (CBC,CMP,AFP), repeat CT CAP and to see Dr. Pink.    4- Mild, will monitor.     5- Improved since last seen in clinic.    6,7- New onset. May be related to dual diuretics being used for ascites. Will give 1L NS today. Hold diuretics until he sees hepatology tomorrow for repeat labs. Will repeat K. If no improvement, may need to send to ER.      Patient was also seen and examined by Dr. Pink. Patient is in agreement  with the proposed treatment plan. All questions were answered to the patient's satisfaction. Pt knows to call clinic if anything is needed before the next clinic visit.    IOANA Francisco  Hematology and Medical Oncology        I have reviewed the notes, assessments, and/or procedures performed by the nurse practitioner, as above.  I have personally interviewed the patient at the beside, and rounded with the nurse practitioner. I formulated the plan of care.  I concur with her documentation of Danilo Jordan.  More than 40 mins were spent during this encounter, greater than 50% was spent in direct counseling and/or coordination of care.     Johnathan Pink M.D., M.S.  Hematology/Oncology Attending  [facility]

## 2017-01-16 NOTE — MR AVS SNAPSHOT
Patient Information     Patient Name Sex     Danilo Ruano Male 1945      Visit Information        Provider Department Dept Phone Center    2017 3:00 PM [facility] [phone] [place]      Patient Instructions      Discharge Instructions: Eating a Low-Potassium Diet  Your health care provider has prescribed a low-potassium diet for you. This kind of diet is advised for people who have certain kidney problems. Potassium is needed for muscle function. But too much potassium is a health risk. Potassium is found in many foods. Read below to find out how to change your diet.  Foods to limit  Some foods are high in potassium. Limit your daily intake of the foods in the list below.  · Fruits: apricots (canned and fresh), bananas, cantaloupe, honeydew melon, kiwi, nectarines, pomegranates, oranges, orange juice, pears, dried fruits (apricots, dates, figs, prunes), and prune juice  · Vegetables: asparagus, avocado, artichoke, bamboo shoots, beets, brussels sprouts, cabbage, celery, chard, okra, potatoes (white and sweet), pumpkin, rutabaga, spinach (cooked), squash, tomato, tomato sauce, tomato juice, and vegetable juice cocktail  · Legumes: black-eyed peas, chickpeas, lentils, lima beans, navy beans, red kidney beans, soybeans, and split peas  · Nuts and seeds: almonds, Brazil nuts, cashews, peanuts, peanut butter, pecans, pumpkin seeds, sunflower seeds, and walnuts  · Breads and cereals: bran and whole-grain products  · Dairy foods: milk, cheese, ice cream, yogurt  · Animal protein: all forms of animal protein  · Other: chocolate, cocoa, coconut milk, and molasses  Tips  · Ask your health care provider how much potassium you are allowed each day. This will help you figure out serving sizes for your needs.  · Check labels for potassium. It may be listed as potassium chloride.  · Do not use salt substitutes. These often have potassium in them.  · Cook frozen fruits  and vegetables in water. Rinse and drain them well before eating.  · Drain liquid from all canned fruits and vegetables. Rinse them before eating.  · Reduce the potassium in potatoes. Peel them, slice thinly, and soak in water for at least 4 hours.  · Reduce the potassium in green leafy vegetables. Soak them in water for at least 4 hours.  · Eat white rice and refined white flour products. These include white bread, pasta, and grits.  Follow-up  Make a follow-up appointment as advised by our staff.  When to call your health care provider  Call your health care provider right away if you have any of the following:  · Fatigue  · Shortness of breath  · Chest pain  · Slow, irregular heartbeat  · Fainting  · Dizziness  · Lightheadedness  · Confusion   © 8731-7882 [employer]. [address] [place] [address], [place] [address]. All rights reserved. This information is not intended as a substitute for professional medical care. Always follow your healthcare professional's instructions.        Hyperkalemia  Hyperkalemia is too much potassium in the blood. This most often occurs in persons who take certain medicines or persons with kidney disease.  This condition often has no symptoms until levels of potassium become high. If symptoms do occur, they include muscle weakness and changes in the heartbeat. A blood test is done to diagnose the problem. An ECG (electrocardiogram) may also be done to test the heartbeat.  If hyperkalemia is caused by a medicine, the healthcare provider may lower the dose or switch to a different medicine. A low-potassium diet may also be prescribed.   Home care  · Be sure your healthcare provider knows about all of the medicines you take. Follow your healthcare provider's advice about making changes to your medicines.  · If a low-potassium diet has been prescribed, follow this closely. If you need help, ask to be referred to a dietitian for advice on how to follow this diet.  Follow-up  care  Follow up with your healthcare provider. You may need a repeat blood test within the next 7 days. Schedule this as advised.  When to seek medical advice  Call your healthcare provider if any of the following occur:  · Weakness, dizziness, or lightheadedness  · Nausea, vomiting, or diarrhea  · Urinating only in small amounts or not urinating  · Symptoms don't go away or get worse  Call 911  Call 911 or emergency services right away if any of the following occur:  · Fast or irregular heartbeat  · Fainting  · Severe shortness of breath  · Chest, arm, shoulder, neck or upper back pain  · Trouble controlling your muscles  © 2963-8263 [employer]. [address] [place] [address], [place] [address]. All rights reserved. This information is not intended as a substitute for professional medical care. Always follow your healthcare professional's instructions.             Your Current Medications Are     furosemide (LASIX) 20 MG tablet    losartan (COZAAR) 50 MG tablet    MULTIVITAMIN (DAILY VITAMINS ORAL)    omeprazole (PRILOSEC) 10 MG capsule    SORAFENIB TOSYLATE (NEXAVAR ORAL)    spironolactone (ALDACTONE) 50 MG tablet      Facility-Administered Medications     sodium chloride 0.9% 1,000 mL infusion      Appointments for Next Year     1/17/2017  7:10 AM NON FASTING LAB (5 min.) [facility], APPOINTMENT [place]    Arrive at check-in approximately 15 minutes before your scheduled appointment time. Bring all outside medical records and imaging, along with a list of your current medications and insurance card.    2nd Floor    1/17/2017  8:00 AM ESTABLISHED PATIENT (30 min.) [facility] - Interventional Rad [facility] INTERVENTIONAL RADIOLOGY    Arrive at check-in approximately 15 minutes before your scheduled appointment time. Bring all outside medical records and imaging, along with a list of your current medications and insurance card.    2nd Floor    1/18/2017  7:30 AM IR EMBOLIZATION (120 min.)  "[facility] [id number]    -Report to the 2nd floor Lab/Radiology Clinic 30 Minutes prior to your appointment time     (*** 6:30 AM appointments please report straight  to the Day of Surgery Center***). - Hold any Blood Thinners 5 days prior to the procedure date unless instructed otherwise.  - Do Not eat or drink anything after MIDNIGHT the day before procedure (The only exception is to take blood pressure and seizure medication with a small amount of water). - Do Not take any Diabetic medicine; if you're on insulin you must speak with your Primary Care Physician. - If allergic to Iodine please take the prep that was ordered by your physician. (If no prep was ordered please call [phone]). - You must have somone come with you to drive you home. If your ride is unable to stay, they must be reachable by phone before the procedure begins. - Chemo Embolizations, Uterine Artery Embolizations (Fibroid Embolizations), Radio Frequency Ablations and Cerebral coiling patients please be prepared to stay overnight.         Default Flowsheet Data (last 24 hours)      Amb Complex Vitals Javier        01/16/17 1542 01/16/17 1407 01/16/17 1205          Measurements    Weight  62.6 kg (138 lb 0.1 oz) 62.6 kg (138 lb 0.1 oz)      Height  5' 6" (1.676 m) 5' 6" (1.676 m)      BSA (Calculated - sq m)  1.71 sq meters 1.71 sq meters      BMI (Calculated)  22.3 22.3      BP (!)  90/54 112/63 (!)  110/59      Pulse 78 89 77      Resp 14 16 18      SpO2   99 %      Pain Assessment    Pain Score Zero Zero Zero              Allergies     No Known Allergies      Medications You Received from 01/15/2017 1725 to 01/16/2017 1725        Date/Time Order Dose Route Action     01/16/2017 1537 sodium chloride 0.9% 1,000 mL infusion   Intravenous New Bag      Current Discharge Medication List     Cannot display discharge medications since this is not an admission.      "

## 2017-01-16 NOTE — PATIENT INSTRUCTIONS
Discharge Instructions: Eating a Low-Potassium Diet  Your health care provider has prescribed a low-potassium diet for you. This kind of diet is advised for people who have certain kidney problems. Potassium is needed for muscle function. But too much potassium is a health risk. Potassium is found in many foods. Read below to find out how to change your diet.  Foods to limit  Some foods are high in potassium. Limit your daily intake of the foods in the list below.  · Fruits: apricots (canned and fresh), bananas, cantaloupe, honeydew melon, kiwi, nectarines, pomegranates, oranges, orange juice, pears, dried fruits (apricots, dates, figs, prunes), and prune juice  · Vegetables: asparagus, avocado, artichoke, bamboo shoots, beets, brussels sprouts, cabbage, celery, chard, okra, potatoes (white and sweet), pumpkin, rutabaga, spinach (cooked), squash, tomato, tomato sauce, tomato juice, and vegetable juice cocktail  · Legumes: black-eyed peas, chickpeas, lentils, lima beans, navy beans, red kidney beans, soybeans, and split peas  · Nuts and seeds: almonds, Brazil nuts, cashews, peanuts, peanut butter, pecans, pumpkin seeds, sunflower seeds, and walnuts  · Breads and cereals: bran and whole-grain products  · Dairy foods: milk, cheese, ice cream, yogurt  · Animal protein: all forms of animal protein  · Other: chocolate, cocoa, coconut milk, and molasses  Tips  · Ask your health care provider how much potassium you are allowed each day. This will help you figure out serving sizes for your needs.  · Check labels for potassium. It may be listed as potassium chloride.  · Do not use salt substitutes. These often have potassium in them.  · Cook frozen fruits and vegetables in water. Rinse and drain them well before eating.  · Drain liquid from all canned fruits and vegetables. Rinse them before eating.  · Reduce the potassium in potatoes. Peel them, slice thinly, and soak in water for at least 4 hours.  · Reduce the potassium  in green leafy vegetables. Soak them in water for at least 4 hours.  · Eat white rice and refined white flour products. These include white bread, pasta, and grits.  Follow-up  Make a follow-up appointment as advised by our staff.  When to call your health care provider  Call your health care provider right away if you have any of the following:  · Fatigue  · Shortness of breath  · Chest pain  · Slow, irregular heartbeat  · Fainting  · Dizziness  · Lightheadedness  · Confusion   © 5282-5043 [employer]. [address] [place] [address]. All rights reserved. This information is not intended as a substitute for professional medical care. Always follow your healthcare professional's instructions.        Hyperkalemia  Hyperkalemia is too much potassium in the blood. This most often occurs in persons who take certain medicines or persons with kidney disease.  This condition often has no symptoms until levels of potassium become high. If symptoms do occur, they include muscle weakness and changes in the heartbeat. A blood test is done to diagnose the problem. An ECG (electrocardiogram) may also be done to test the heartbeat.  If hyperkalemia is caused by a medicine, the healthcare provider may lower the dose or switch to a different medicine. A low-potassium diet may also be prescribed.   Home care  · Be sure your healthcare provider knows about all of the medicines you take. Follow your healthcare provider's advice about making changes to your medicines.  · If a low-potassium diet has been prescribed, follow this closely. If you need help, ask to be referred to a dietitian for advice on how to follow this diet.  Follow-up care  Follow up with your healthcare provider. You may need a repeat blood test within the next 7 days. Schedule this as advised.  When to seek medical advice  Call your healthcare provider if any of the following occur:  · Weakness, dizziness, or lightheadedness  · Nausea,  vomiting, or diarrhea  · Urinating only in small amounts or not urinating  · Symptoms don't go away or get worse  Call 911  Call 911 or emergency services right away if any of the following occur:  · Fast or irregular heartbeat  · Fainting  · Severe shortness of breath  · Chest, arm, shoulder, neck or upper back pain  · Trouble controlling your muscles  © 1957-6814 "[employer] [address] [place] [address], [place] [address]. All rights reserved. This information is not intended as a substitute for professional medical care. Always follow your healthcare professional's instructions.

## 2017-01-16 NOTE — PLAN OF CARE
Problem: Patient Care Overview  Goal: Individualization & Mutuality  Outcome: Ongoing (interventions implemented as appropriate)  1540-Labs , hx, and medications reviewed. Assessment completed. Discussed plan of care with patient. Patient in agreement. Chair reclined and warm blanket and snack offered.

## 2017-01-17 NOTE — ED TRIAGE NOTES
Danilo Winstonvallos, a 71 y.o. male presents to the ED complaining of hyperkalemia. Pt went to clinic this morning and had lab work done. Pt was told to come to the ED for further evaluation for hyperkalemia. Pt denies any complaints      Chief Complaint   Patient presents with    Hyperkalemia     sent from [facility], had infusion to lower potassium and still high     Review of patient's allergies indicates:  No Known Allergies  Past Medical History   Diagnosis Date    HCC (hepatocellular carcinoma) 5/17/2016    Hypertension

## 2017-01-17 NOTE — ED PROVIDER NOTES
Encounter Date: 1/16/2017    SCRIBE #1 NOTE: I, Marylu Fritz, am scribing for, and in the presence of,  Bety Daniel MD. I have scribed the entire note.       History     Chief Complaint   Patient presents with    Hyperkalemia     sent from [facility], had infusion to lower potassium and still high     Review of patient's allergies indicates:  No Known Allergies  HPI Comments: .Time seen by provider: 8:25 PM    This is a 71 y.o. Male with PMHx of Hepatocellular Carcinoma and HTN who was sent to the ED for Hyperkalemia and new onset renal failure per Hepatology and Hem/Onc. Pt had labs checked today at clinic which are summarized below. Pt currently staying at the [facility] because of procedure in 2 days and appointment tomorrow morning at 7 AM. Pt reports he started taking Lasix 2 or 3 months ago. First started at 60 mg, but was then decreased to 40 for BP control. Pt also started taking Aldactone about 1.5 months ago. Other than normal hunger and thirst, pt is asymptomatic at this time denying any CP, SOB, dizziness, HA or any other sx. No further complaints or concerns noted.            The history is provided by the patient and medical records.     Past Medical History   Diagnosis Date    HCC (hepatocellular carcinoma) 5/17/2016    Hypertension      Past Medical History Pertinent Negatives   Diagnosis Date Noted    COPD (chronic obstructive pulmonary disease) 5/16/2016    Diabetes mellitus, type 2 5/16/2016    Thyroid disease 5/16/2016     Past Surgical History   Procedure Laterality Date    Rotator cuff repair      Skin graft       Family History   Problem Relation Age of Onset    No Known Problems Mother     No Known Problems Father     No Known Problems Sister     No Known Problems Brother     No Known Problems Maternal Aunt     No Known Problems Maternal Uncle     No Known Problems Paternal Aunt     No Known Problems Paternal Uncle     No Known Problems Maternal Grandmother      No Known Problems Maternal Grandfather     No Known Problems Paternal Grandmother     No Known Problems Paternal Grandfather     Amblyopia Neg Hx     Blindness Neg Hx     Cancer Neg Hx     Cataracts Neg Hx     Diabetes Neg Hx     Glaucoma Neg Hx     Hypertension Neg Hx     Macular degeneration Neg Hx     Retinal detachment Neg Hx     Strabismus Neg Hx     Stroke Neg Hx     Thyroid disease Neg Hx      Social History   Substance Use Topics    Smoking status: Former Smoker    Smokeless tobacco: None    Alcohol use Yes      Comment: socially     Review of Systems   Constitutional: Negative for fever.        Currently thirsty and hungry. Denies all other sx.    HENT: Negative for sore throat.    Respiratory: Negative for shortness of breath.    Cardiovascular: Negative for chest pain.   Gastrointestinal: Negative for nausea.   Genitourinary: Negative for dysuria.   Musculoskeletal: Negative for back pain.   Skin: Negative for rash.   Neurological: Negative for dizziness and weakness.   Hematological: Does not bruise/bleed easily.       Physical Exam   Initial Vitals   BP Pulse Resp Temp SpO2   01/16/17 1828 01/16/17 1828 01/16/17 1828 01/16/17 1828 01/16/17 1828   112/55 85 18 97.3 °F (36.3 °C) 99 %     Physical Exam    Nursing note and vitals reviewed.  Constitutional: He appears well-developed and well-nourished. No distress.   Pt is comfortable and well appearing   HENT:   Head: Normocephalic and atraumatic.   Mouth/Throat: Mucous membranes are dry.   Eyes: Conjunctivae and EOM are normal. Pupils are equal, round, and reactive to light. No scleral icterus.   Neck: Normal range of motion. Neck supple.   Cardiovascular: Normal rate, regular rhythm and normal heart sounds.   No murmur heard.  Pulmonary/Chest: Breath sounds normal. No respiratory distress. He has no wheezes. He has no rales.   Abdominal: Soft. He exhibits no distension. There is no tenderness. There is no rebound.   Musculoskeletal:  Normal range of motion. He exhibits no edema.   Neurological: He is alert and oriented to person, place, and time. He has normal strength. No sensory deficit.   Skin: Skin is warm and dry. No rash noted.   (-) Jaundice   Psychiatric: He has a normal mood and affect. His behavior is normal. Thought content normal.         ED Course   Procedures  Labs Reviewed   ISTAT PROCEDURE - Abnormal; Notable for the following:        Result Value    POC BUN 42 (*)     POC Creatinine 1.6 (*)     POC Sodium 135 (*)     POC Chloride 111 (*)     POC Hematocrit 34 (*)     All other components within normal limits     EKG Readings: (Independently Interpreted)   Initial Reading: No STEMI. Rhythm: Normal Sinus Rhythm. Conduction: Normal. ST Segments: Normal ST Segments. T Waves: Normal.          Medical Decision Making:   History:   Old Medical Records: I decided to obtain old medical records.  Old Records Summarized: other records.       <> Summary of Records: Pt with Hx of Hepatocellular Carcinoma who is followed by hepatology and hem/onc. Pt had labs checked today at clinic appointment. His BUN and Creatinine were 46/2.1 this morning with a potassium of 6.2. Pt then received 1 L of fluids and his repeat BUN and Creatinine was 47/1.8 with a potassium of 5.7. He was then sent to the ED for further evaluation.  Initial Assessment:   72 yo m, h/o cirrhosis and HCC, started on lasix and aldactone in recent months 2/2 ascites, now presenting from heme/onc and hepatology clinics for new onset renal failure and hyperkalemia.  Labs as documented above, with improvement s/p 1 L NS.  Pt completely asx.  Suspect that DONI 2/2 dehydration 2/2 diuretic use.  Aldactone likely contributing to hyperkalemia.  No EKG changes.  Most recent K in clinic down to 5.7.  Will give additional 1 L NS and kayexalate and then repeat K.  dispo uncertain but pt has f/u scheduled in am for repeat labs.  Independently Interpreted Test(s):   I have ordered and  independently interpreted EKG Reading(s) - see prior notes  Clinical Tests:   Lab Tests: Ordered and Reviewed  Medical Tests: Ordered and Reviewed            Scribe Attestation:   Scribe #1: I performed the above scribed service and the documentation accurately describes the services I performed. I attest to the accuracy of the note.    Attending Attestation:           Physician Attestation for Scribe:  Physician Attestation Statement for Scribe #1: I, Bety Daniel MD, reviewed documentation, as scribed by Marylu Fritz in my presence, and it is both accurate and complete.                 ED Course     11:11 PM  K 5.1.  Pt safe for d/c home, has f/u for 7 am tomorrow.   Pt also staying at [facility].  Will d/c home    Clinical Impression:   The encounter diagnosis was Hyperkalemia.    Disposition:   Disposition: Discharged  Condition: Stable       Bety Daniel MD  01/23/17 1954

## 2017-01-17 NOTE — PROVIDER PROGRESS NOTES - EMERGENCY DEPT.
Encounter Date: 1/16/2017    ED Physician Progress Notes       SCRIBE NOTE: I, Marylu Fritz, am scribing for, and in the presence of,  Dr. Salinas.  Physician Statement: I, Dr. Salinas, personally performed the services described in this documentation as scribed by Marylu Fritz in my presence, and it is both accurate and complete.      EKG - STEMI Decision  Initial Reading: No STEMI present.

## 2017-01-17 NOTE — ED NOTES
Patient identifiers verified and correct for Danilo Jordan.    LOC: The patient is awake, alert and aware of environment with an appropriate affect, the patient is oriented x 3 and speaking appropriately.  APPEARANCE: Patient resting comfortably and in no acute distress, patient is clean and well groomed, patient's clothing is properly fastened.  SKIN: The skin is warm and dry, color consistent with ethnicity, patient has normal skin turgor and moist mucus membranes, skin intact, no breakdown or bruising noted.  MUSCULOSKELETAL: Patient moving all extremities spontaneously, no obvious swelling or deformities noted.  RESPIRATORY: Airway is open and patent, respirations are spontaneous, patient has a normal effort and rate, no accessory muscle use noted, bilateral breath sounds even and clear.  CARDIAC: Patient has a normal rate and regular rhythm, no periphreal edema noted, capillary refill < 3 seconds.  ABDOMEN: Soft and non tender to palpation, no distention noted, normoactive bowel sounds present in all four quadrants.  NEUROLOGIC: Pupils equal bilaterally, eyes open spontaneously, behavior appropriate to situation, follows commands, facial expression symmetrical, bilateral hand grasp equal and even, purposeful motor response noted, normal sensation in all extremities when touched with a finger.    Pt denies any complaints

## 2017-01-17 NOTE — PROGRESS NOTES
Subjective:       Patient ID: Danilo Jordan is a 71 y.o. male.    Chief Complaint: Hepatocellular Carcinoma    HPI Comments: Patient in today for a pre procedure consult for a chemoembolization for hepatocellular carcinoma scheduled for 01/18/17. Patient was hyperkalemic (potassium 6.2) with decrease in GFR on 1/16/17 he was sent to the ED yesterday and given 2 liters of IV fluid for rehydration.  Potassium decreased to 5.7, prior to discharge from the ED. Patient has no complaints today. CMP collected this morning, results pending at the time of the visit. Patient's last doses of Aldactone and Lasix taken on Sunday (two days ago).       Review of Systems   Constitutional: Positive for activity change, appetite change (improving since discontinuing Nexavar), fatigue and unexpected weight change (15 lbs in the last 3 months). Negative for chills and fever.   Respiratory: Negative for cough, chest tightness and shortness of breath.    Cardiovascular: Positive for leg swelling (minimal since discontinuing diuretics). Negative for chest pain and palpitations.   Gastrointestinal: Positive for diarrhea (occasional ). Negative for abdominal distention, abdominal pain, blood in stool, constipation, nausea and vomiting.   Genitourinary: Negative for decreased urine volume, difficulty urinating, dysuria, flank pain, frequency and urgency.   Musculoskeletal: Negative.    Neurological: Negative for dizziness, seizures, syncope, weakness, light-headedness, numbness and headaches.   Hematological: Does not bruise/bleed easily.       Objective:      Physical Exam   Constitutional: He appears well-developed and well-nourished.   Eyes: Pupils are equal, round, and reactive to light. No scleral icterus.   Cardiovascular: Normal rate, regular rhythm, normal heart sounds and intact distal pulses.  Exam reveals no gallop and no friction rub.    No murmur heard.  Pulmonary/Chest: Effort normal and breath sounds normal.    Vitals reviewed.      Assessment:       1. HCC (hepatocellular carcinoma)        Plan:     TACE procedure discussed in detail with the patient including risks, benefits, potential complications, usual pre and post procedure course, as well as the need for overnight hospital stay. Dr. Olivier came in to speak with the patient. He is recommending to repeat labs (CMP) to assess potassium level and renal function prior to proceeding with the TACE. Informed consent signed. Verbal and written pre procedure instructions provided. Patient verbalized understanding.  Contact information verified in [facility]. Clinic contact information provided. Repeat CMP scheduled for tomorrow morning.

## 2017-01-17 NOTE — DISCHARGE INSTRUCTIONS
Instrucciones de sima para la hiperkalemia  A usted le valdivia diagnosticado hiperkalemia (un nivel alto de potasio en la fahad). El potasio es importante para el funcionamiento de las células nerviosas y musculares, incluyendo las células del corazón, tye un alto nivel de potasio en la fahad puede causar ritmos cardíacos anormales e incluso ataques cardíacos.  Cambios en la dieta  · Reduzca bustamante consumo de los siguientes alimentos ricos en potasio:  ¨ Bananas (evítelas completamente)  ¨ Albaricoques (chabacanos) frescos o secos  ¨ Naranjas y jugo de naranja  ¨ Tomates, salsa de tomate y jugo de tomate  ¨ Espinacas  ¨ Verduras de hojas verdes, adriel ensaladas verdes, acelgas y berzas  ¨ Melones (de cualquier tipo)  ¨ Chícharos (arvejas)  ¨ Frijoles  ¨ Celine  ¨ Batatas  ¨ Aguacates y guacamole  ¨ Jugo de verduras (nicanor sea hecho en casa o comprado) y mezclas de jugos de verduras  ¨ Jugos de frutas  Otros cuidados en la casa  · Informe al médico sobre todos los medicamentos con o sin receta que esté tomando. Algunos medicamentos pueden aumentar los niveles de potasio.  · Maish Vaya los medicamentos exactamente según las indicaciones.  · Yasmeen que le revisen bustamante nivel de potasio regularmente.  · Asista a todas las visitas de control. El médico deberá vigilar de cerca bustamante trastorno.  · Aprenda a tomarse el pulso. Si bustamante pulso es inferior a 60 latidos por minuto o si es irregular, llame al médico.  Visitas de control  Programe ramone visita de control según le indique el personal médico.  Cuándo debe llamar al médico  Llame al médico inmediatamente si presenta cualquiera de los siguientes síntomas:  · Dolor en el pecho (llame al 911)  · Desmayos (llame al 911)  · Falta de aliento o dificultad para respirar (llame al 911 si es grave)  · Ritmo cardíaco demasiado lento o irregular  · Fatiga  · Mareos  · Aturdimiento  · Confusión         © 4446-3727 [employer]. [address] [place] [address], [place] [address]. Todos los derechos  reservados. Esta información no pretende sustituir la atención médica profesional. Sólo bustamante médico puede diagnosticar y tratar un problema de antonino.

## 2017-01-17 NOTE — NURSING
Spoke with Caroline Redd NP. Dr. Pink wants patient to proceed to ED for persistent elevated K. Transport called to escort patient.

## 2017-01-18 NOTE — TELEPHONE ENCOUNTER
Phone call (complete )   Arrival time-  11      NPO reinforced  Allergies reviewed  Directions given  Instructed to take meds in AM  Has Ride (in [employer] )             Labs (ordered  )      Approx recovery length discussed

## 2017-01-19 NOTE — PROCEDURES
Radiology Post-Procedure Note    Pre Op Diagnosis: Hepatocellular carcinoma    Post Op Diagnosis: Hepatocellular carcinoma    Procedure: Chemoembolization    Procedure Performed by: Charline MARLOW, Elham Bell    Written Informed Consent Obtained: Yes    Specimen Removed: None    Estimated Blood Loss: Minimal    Findings:     After placement of a left radial artery sheath, a 5-Tunisian catheter was inserted and angiography of the celiac artery for anatomic evaluation and localization of hepatic tumor.  A microcatheter was introduced into feeding arteries of a right hepatic lobe tumor and LC beads coated with doxorubicin were injected until near stagnant flow was achieved.  Post procedural angiography revealed no complications.    The sheath was removed.  Hemostasis was achieved using TR band compression.  There was a small hematoma at the time of hemostasis.    The patient tolerated procedure well.  Please see Imaging report for further details.    Bakari Bell MD  Resident  Department of Radiology  Pager: [phone]

## 2017-01-19 NOTE — IP AVS SNAPSHOT
[facility]  [address] [facility] [place] [address]  Phone: [phone]           Instrucciones de Xuan de Pacientes    Nuestro objetivo es que te prepara para el éxito. Jing paquete incluye información sobre bustamante enfermedad, medicamentos y atención médica a domicilio. Jardine le ayudará a cuidar y que no se enferman más y necesita volver al hospital.     Por favor, pregunte a bustamante enfermera si tiene alguna pregunta.       Hay muchos detalles a recordar cuando se prepara para salir del hospital. Jardine es lo que necesita hacer:    1. Bethany bustamante medicina. Si usted tiene ramone receta, revise la lista de medicamentos en las siguientes páginas. Es posible que tenga nuevos medicamentos para recoger en la farmacia y otros que tendrá que dejar de samson. Revise las instrucciones sobre cómo y cuándo samson pako medicamentos. Consulte con el médico o el enfermeras si no está seguro de qué hacer.    2. Ir a pako citas de seguimiento. La información específica de seguimiento aparece en las siguientes páginas. Usted puede ser contactado por ramone enfermera o proveedor clínico sobre las próximas citas. Estar seguro de que tiene todos los números de teléfono para comunicarse si es necesario. Por favor, póngase en contacto con la oficina de bustamante profesional médico si no puede hacer ramone eran.     3. Esté atento a señales de advertencia. El médico o la enfermera le dará señales de alarma detallados que debe observar y cuándo llamar para la ayuda. Estas instrucciones también pueden incluir información educativa acerca de bustamante condición. Si usted experimenta cualquiera de las señales de advertencia para bustamante antonino, llame a bustamante médico.             [facility]    Si bustamante médico no le ha indicado a lo contrário, por favor   contactar a [facility], nuestra línea de cuidado de enfermeras está disponible para asistirle 24/7.    [phone] (servicio gratuito)    Enfermeras registradas de [facility] pueden ayudarle a  reservar ramone eran, proveer educación para la antonino, asesoría clínica, y otros servicios de asesoramiento.                  ** Verificar la lista de medicamentos es correcta y está actualizada. Llevar esto con usted en casey de emergencia. Si pako medicamentos valdivia cambiado, por favor notifique a bustamante proveedor de atención médica.             Lista de medicamentos      EMPIECE a samson estos medicamentos        Additional Info                      amoxicillin-clavulanate 500-125mg 500-125 mg Tab   También conocido adriel:  AUGMENTIN   Cantidad:  14 tablet   Recargas:  0   Dosis:  1 tablet    Instrucciones:  Take 1 tablet (500 mg total) by mouth 2 (two) times daily.     Begin Date    AM    Noon    PM    Bedtime       ondansetron 4 MG tablet   También conocido adriel:  ZOFRAN   Cantidad:  28 tablet   Recargas:  0   Dosis:  4 mg    Instrucciones:  Take 1 tablet (4 mg total) by mouth every 6 (six) hours as needed for Nausea.     Begin Date    AM    Noon    PM    Bedtime       oxycodone 5 MG immediate release tablet   También conocido adriel:  ROXICODONE   Cantidad:  20 tablet   Recargas:  0   Dosis:  5 mg    Instrucciones:  Take 1 tablet (5 mg total) by mouth every 6 (six) hours as needed for Pain.     Begin Date    AM    Noon    PM    Bedtime         CONSULTE con bustamante médico sobre estos medicamentos        Additional Info                      furosemide 20 MG tablet   También conocido adriel:  LASIX   Cantidad:  90 tablet   Recargas:  3   Dosis:  60 mg    Instrucciones:  Take 3 tablets (60 mg total) by mouth once daily.     Begin Date    AM    Noon    PM    Bedtime       losartan 50 MG tablet   También conocido adriel:  COZAAR   Recargas:  0   Dosis:  50 mg    Instrucciones:  Take 50 mg by mouth once daily.     Begin Date    AM    Noon    PM    Bedtime       NEXAVAR ORAL   Recargas:  0    Instrucciones:  Take by mouth.     Begin Date    AM    Noon    PM    Bedtime       omeprazole 10 MG capsule   También conocido adriel:  PRILOSEC    Recargas:  0   Dosis:  10 mg    Instrucciones:  Take 10 mg by mouth once daily.     Begin Date    AM    Noon    PM    Bedtime       spironolactone 50 MG tablet   También conocido adriel:  ALDACTONE   Recargas:  0   Dosis:  50 mg    Instrucciones:  Take 50 mg by mouth 2 (two) times daily.     Begin Date    AM    Noon    PM    Bedtime            Dónde puede recoger pako medicamentos      Puede obtener estos medicamentos en cualquier farmacia     Traiga ramone receta en papel para cada kelly de estos medicamentos     amoxicillin-clavulanate 500-125mg 500-125 mg Tab    ondansetron 4 MG tablet    oxycodone 5 MG immediate release tablet                  Por favor traiga a todos las citas de seguimiento:    1. Ramone copia de las instrucciones de sima.  2. Todos los medicamentos que está tomando lee momento, en pako envases originales.  3. Identificación y tarjeta de seguro.    Por favor llegue 15 minutos antes de la hora de la eran.    Por favor llame con 24 horas de antelación si tiene que cambiar bustamante eran y / o tiempo.            Instrucciones a leyla de sima       The following are instructions your doctor would like you to follow regarding your activity, diet, and follow up care.    Your procedure was done by making a small puncture in your wrist. You must observe that area for bleeding and swelling once you are discharged from the hospital. Be careful not to over-stimulate the affected wrist for 24 hours.    1. Do not strenuously flex the wrist for 24 hours.  2. Occlusive bandage (Tegaderm) may be removed after 24 hours.  3. At 24 hours after your procedure, remove the Tagaderm bandage and leave puncture site open to air. If minor oozing, apply adhesive bandage (Band-Aid) and remove after 12 hours.  4. No driving for 24 hours.  5. No soaking wrist for 2 days. Gently wash site with soap and water. Dry well. Do not apply powders, lotions or ointments. No tub baths, whirlpool or swimming for 48 hours.  6. No lifting more than 3-5  "pounds with affected wrist for 7 days.  7. Restart your usual diet and medications with appropriate changes as dictated by your doctor.  8. For any bleeding, hold pressure with thumb against puncture site and finger against back of wrist.  9. If you see fresh bleeding, bright red or a lump that is getting bigger at the puncture site, apply pressure to the area as instructed above and call [phone] between the hours of 8:00am-5:00pm or [phone] after hours and ask to speak to the Interventional Radiology Resident on-call. If you are unable to control the bleeding, call [phone].   10. Call the Interventional Radiology Resident on-call at [phone] with any concerns or any of the following:  - Swelling, redness, discharge for the site, large bruising or increasing pain, numbness or tingling at the site  - Temperature of 101 F or higher  - Shortness of breath      Referencias/Adjuntos de sima     HEPATIC ANGIOGRAPHY, DISCHARGE INSTRUCTIONS (Moldovan)    SEDATION, PROCEDURAL (ADULT) (Moldovan)        Información de Admisión     Fecha y hora Proveedor Departamento CSN    1/19/2017 10:37 AM Isaías Olivier MD [facility] [id number]      Los proveedores de cuidado     Personal Médico Rol [facility] Teléfono principal de la oficina    Isaías Olivier MD Attending Provider Diagnostic Radiology [phone]    [facility] Diagnostic Provider Surgeon  -- Number not on file      Esme signos vitales jonathan     PS Pulso Temperatura Resp [place]    122/66 90 97.5 °F (36.4 °C) (Oral) 20 5' 6" (1.676 m) 64.9 kg (143 lb)    SpO2 BMI ([facility])                99% 23.08 kg/m2          Recent Lab Values        5/16/2016                           7:35 AM           A1C 5.2                       Alergias     A partir del:  1/19/2017        No Known Allergies      Directiva Anticipada     Earlene directiva anticipada es un documento que, en casey de que ya no capaz de hacer decisiones por sí mismo, le dice a bustamante equipo médico qué " tipo de tratamiento quiere o no quiere recibir, o que le gustaría samson esas decisiones para usted . Si actualmente no tiene ramone directiva anticipada, [facility] le anima a crear kelly. Para más información llame al: [phone]), [phone] ([phone]), o entrando en [email or web address].        Smoking Cessation     Si desea dejar de fumar:  · Usted puede ser elegible para recibir servicios gratuitos si usted es un residente de [place] y comenzó a fumar cigarrillos antes del 1 de septiembre de 1988. Llame al Smoking Cessation Trust (SCT) a [phone] o [phone].   Llame 1-800-QUIT-NOW si usted no cumple con los criterios anteriores.            Language Assistance Services     ATTENTION: Language assistance services are available, free of charge. Please call [phone].      ATENCIÓN: Si habla español, tiene a bustamante disposición servicios gratuitos de asistencia lingüística. Llame al [phone].     CHÚ Ý: N?u b?n nói Ti?ng Vi?t, có các d?ch v? h? tr? ngôn ng? mi?n phí dành cho b?n. G?i s? [phone].         [facility] cumple con las leyes federales aplicables de derechos civiles y no discrimina por motivos de vanessa, color, origen nacional, edad, discapacidad, o sexo.                        [address] [place] [address]  Phone: [phone]           Patient Discharge Instructions     Our goal is to set you up for success. This packet includes information on your condition, medications, and your home care. It will help you to care for yourself so you don't get sicker and need to go back to the hospital.     Please ask your nurse if you have any questions.        There are many details to remember when preparing to leave the hospital. Here is what you will need to do:    1. Take your medicine. If you are prescribed medications, review your Medication List in the following pages. You may have new medications to   at the pharmacy and others that you'll need to stop taking. Review the instructions for how and when to take your medications. Talk with your doctor or nurses if you are unsure of what to do.     2. Go to your follow-up appointments. Specific follow-up information is listed in the following pages. Your may be contacted by a transition nurse or clinical provider about future appointments. Be sure we have all of the phone numbers to reach you, if needed. Please contact your provider's office if you are unable to make an appointment.     3. Watch for warning signs. Your doctor or nurse will give you detailed warning signs to watch for and when to call for assistance. These instructions may also include educational information about your condition. If you experience any of warning signs to your health, call your doctor.               [facility]  Unless otherwise directed by your provider, please contact [facility], our nurse care line that is available for 24/7 assistance.     [phone] (toll-free)    Registered nurses in the [facility] provide clinical advisement, health education, appointment booking, and other advisory services.                ** Verificar la lista de medicamentos es correcta y está actualizada. Llevar esto con usted en casey de emergencia. Si pako medicamentos valdivia cambiado, por favor notifique a bustamante proveedor de atención médica.             Medication List      START taking these medications        Additional Info                      amoxicillin-clavulanate 500-125mg 500-125 mg Tab   Commonly known as:  AUGMENTIN   Quantity:  14 tablet   Refills:  0   Dose:  1 tablet    Instructions:  Take 1 tablet (500 mg total) by mouth 2 (two) times daily.     Begin Date    AM    Noon    PM    Bedtime       ondansetron 4 MG tablet   Commonly known as:  ZOFRAN   Quantity:  28 tablet   Refills:  0   Dose:  4 mg    Instructions:  Take 1 tablet (4 mg total) by mouth every 6 (six)  hours as needed for Nausea.     Begin Date    AM    Noon    PM    Bedtime       oxycodone 5 MG immediate release tablet   Commonly known as:  ROXICODONE   Quantity:  20 tablet   Refills:  0   Dose:  5 mg    Instructions:  Take 1 tablet (5 mg total) by mouth every 6 (six) hours as needed for Pain.     Begin Date    AM    Noon    PM    Bedtime         ASK your doctor about these medications        Additional Info                      furosemide 20 MG tablet   Commonly known as:  LASIX   Quantity:  90 tablet   Refills:  3   Dose:  60 mg    Instructions:  Take 3 tablets (60 mg total) by mouth once daily.     Begin Date    AM    Noon    PM    Bedtime       losartan 50 MG tablet   Commonly known as:  COZAAR   Refills:  0   Dose:  50 mg    Instructions:  Take 50 mg by mouth once daily.     Begin Date    AM    Noon    PM    Bedtime       NEXAVAR ORAL   Refills:  0    Instructions:  Take by mouth.     Begin Date    AM    Noon    PM    Bedtime       omeprazole 10 MG capsule   Commonly known as:  PRILOSEC   Refills:  0   Dose:  10 mg    Instructions:  Take 10 mg by mouth once daily.     Begin Date    AM    Noon    PM    Bedtime       spironolactone 50 MG tablet   Commonly known as:  ALDACTONE   Refills:  0   Dose:  50 mg    Instructions:  Take 50 mg by mouth 2 (two) times daily.     Begin Date    AM    Noon    PM    Bedtime            Where to Get Your Medications      You can get these medications from any pharmacy     Bring a paper prescription for each of these medications     amoxicillin-clavulanate 500-125mg 500-125 mg Tab    ondansetron 4 MG tablet    oxycodone 5 MG immediate release tablet                  Por favor traiga a todos las citas de seguimiento:    1. Earlene copia de las instrucciones de sima.  2. Todos los medicamentos que está tomando lee momento, en pako envases originales.  3. Identificación y tarjeta de seguro.    Por favor llegue 15 minutos antes de la hora de la eran.    Por favor llame con 24 horas de  antelación si tiene que cambiar bustamante eran y / o tiempo.            Discharge Instructions       The following are instructions your doctor would like you to follow regarding your activity, diet, and follow up care.    Your procedure was done by making a small puncture in your wrist. You must observe that area for bleeding and swelling once you are discharged from the hospital. Be careful not to over-stimulate the affected wrist for 24 hours.    1. Do not strenuously flex the wrist for 24 hours.  2. Occlusive bandage (Tegaderm) may be removed after 24 hours.  3. At 24 hours after your procedure, remove the Tagaderm bandage and leave puncture site open to air. If minor oozing, apply adhesive bandage (Band-Aid) and remove after 12 hours.  4. No driving for 24 hours.  5. No soaking wrist for 2 days. Gently wash site with soap and water. Dry well. Do not apply powders, lotions or ointments. No tub baths, whirlpool or swimming for 48 hours.  6. No lifting more than 3-5 pounds with affected wrist for 7 days.  7. Restart your usual diet and medications with appropriate changes as dictated by your doctor.  8. For any bleeding, hold pressure with thumb against puncture site and finger against back of wrist.  9. If you see fresh bleeding, bright red or a lump that is getting bigger at the puncture site, apply pressure to the area as instructed above and call [phone] between the hours of 8:00am-5:00pm or [phone] after hours and ask to speak to the Interventional Radiology Resident on-call. If you are unable to control the bleeding, call [phone].   10. Call the Interventional Radiology Resident on-call at [phone] with any concerns or any of the following:  - Swelling, redness, discharge for the site, large bruising or increasing pain, numbness or tingling at the site  - Temperature of 101 F or higher  - Shortness of breath      Discharge References/Attachments     HEPATIC ANGIOGRAPHY, DISCHARGE INSTRUCTIONS (Malawian)     "SEDATION, PROCEDURAL (ADULT) (Maori)        Admission Information     Date & Time Provider Department CSN    1/19/2017 10:37 AM Isaías Olivier MD [facility] [id number]      Care Providers     Provider Role Specialty Primary office phone    Isaías Olivier MD Attending Provider Diagnostic Radiology [phone]    [facility] Diagnostic Provider Surgeon  -- Number not on file      Your Vitals Were     BP Pulse Temp Resp Height Weight    122/66 90 97.5 °F (36.4 °C) (Oral) 20 5' 6" (1.676 m) 64.9 kg (143 lb)    SpO2 BMI                99% 23.08 kg/m2          Recent Lab Values        5/16/2016                           7:35 AM           A1C 5.2                       Allergies as of 1/19/2017     No Known Allergies      Advance Directives     An advance directive is a document which, in the event you are no longer able to make decisions for yourself, tells your healthcare team what kind of treatment you do or do not want to receive, or who you would like to make those decisions for you.  If you do not currently have an advance directive, [facility] encourages you to create one.  For more information call:  [phone]), [phone] ([phone]),  or log on to [email or web address].        Smoking Cessation     If you would like to quit smoking:   You may be eligible for free services if you are a [place] resident and started smoking cigarettes before September 1, 1988.  Call the Smoking Cessation Trust ([facility]) toll free at (783) 657-4922 or (828) 170-2955.   Call 1-800-QUIT-NOW if you do not meet the above criteria.            Language Assistance Services     ATTENTION: Language assistance services are available, free of charge. Please call [phone].      ATENCIÓN: Si habla español, tiene a bustamante disposición servicios gratuitos de asistencia lingüística. Llame al [phone].     CHÚ Ý: N?u b?n nói Ti?ng Vi?t, có các d?ch v? h? tr? ngôn ng? mi?n phí dành cho b?n. G?i s? " [phone].         [facility] complies with applicable Federal civil rights laws and does not discriminate on the basis of race, color, national origin, age, disability, or sex.

## 2017-01-19 NOTE — PROGRESS NOTES
TR band removed and skin tear noted to left forearm. Per technician, skin tear occurred post procedure when drape was removed. Pt's wound covered with mepore dressing.

## 2017-01-19 NOTE — PROGRESS NOTES
Dr. Olivier aware that patient is spending the night at the [facility], family unavailable, monitor patient for three hours in recovery prior to release to [facility]

## 2017-01-19 NOTE — DISCHARGE INSTRUCTIONS
The following are instructions your doctor would like you to follow regarding your activity, diet, and follow up care.    Your procedure was done by making a small puncture in your wrist. You must observe that area for bleeding and swelling once you are discharged from the hospital. Be careful not to over-stimulate the affected wrist for 24 hours.    1. Do not strenuously flex the wrist for 24 hours.  2. Occlusive bandage (Tegaderm) may be removed after 24 hours.  3. At 24 hours after your procedure, remove the Tagaderm bandage and leave puncture site open to air. If minor oozing, apply adhesive bandage (Band-Aid) and remove after 12 hours.  4. No driving for 24 hours.  5. No soaking wrist for 2 days. Gently wash site with soap and water. Dry well. Do not apply powders, lotions or ointments. No tub baths, whirlpool or swimming for 48 hours.  6. No lifting more than 3-5 pounds with affected wrist for 7 days.  7. Restart your usual diet and medications with appropriate changes as dictated by your doctor.  8. For any bleeding, hold pressure with thumb against puncture site and finger against back of wrist.  9. If you see fresh bleeding, bright red or a lump that is getting bigger at the puncture site, apply pressure to the area as instructed above and call [phone] between the hours of 8:00am-5:00pm or [phone] after hours and ask to speak to the Interventional Radiology Resident on-call. If you are unable to control the bleeding, call [phone].   10. Call the Interventional Radiology Resident on-call at [phone] with any concerns or any of the following:  - Swelling, redness, discharge for the site, large bruising or increasing pain, numbness or tingling at the site  - Temperature of 101 F or higher  - Shortness of breath

## 2017-01-19 NOTE — DISCHARGE SUMMARY
Radiology Discharge Summary      Hospital Course: No complications    Admit Date: 1/19/2017  Discharge Date: 01/19/2017     Instructions Given to Patient: Yes  Diet: Resume prior diet  Activity: activity as tolerated    Description of Condition on Discharge: Stable  Vital Signs (Most Recent): Temp: 97.5 °F (36.4 °C) (01/19/17 1545)  Pulse: 81 (01/19/17 1615)  Resp: 18 (01/19/17 1615)  BP: (!) 101/52 (01/19/17 1615)  SpO2: 99 % (01/19/17 1615)    Discharge Disposition: Home    Discharge Diagnosis: HCC s/p TACE    Bakari Bell MD  Resident  Department of Radiology  Pager: [phone]

## 2017-01-19 NOTE — PROGRESS NOTES
Date: 1/19/2017 Protocol: TACE IRB#: [id number] PI: Angel Luis Mena MD Subject: T014 Visit: Initial     · Met with participant to discuss possible participation in a research study. Offered  services in [place] which pt declined.  · Participant was given a copy of the Informed Consent Form for review  · Participant read the Informed Consent Form in full   · All risks, benefits, alternative therapies, confidentiality, and study requirements were discussed  · Privacy issues, including HIPAA, were discussed  · Withdrawal options, including HIPAA, were discussed  · Ample opportunity was provided for participant to ask questions and to consider participation  · Participant verbalized that all questions were satisfactorily answered and that they understood the protocol and its requirements  · Participant was provided with contact information for the investigator, physician & research coordinator for future questions or concerns  · Participant denied involvement in any other research study  · Informed consent was signed and participant was provided with a signed consent form for his/her records. All pages & blanks for initials were initialed.  · Consent was obtained prior to conducting any study-related procedures

## 2017-01-19 NOTE — PROGRESS NOTES
Patient does not have any family present. Son is living in [place]. Dr. Olivier made aware.      Dr. Bell at bedside to discuss patient staying in the observation unit overnight. Patient is adamant about returning to the [facility] and verbalized understanding of risks and refused to stay. Patient is leaving state tomorrow morning and cannot risk missing flight.

## 2017-01-20 NOTE — PROGRESS NOTES
Recovery completed, pt tolerated well. No apparent distress noted. Discharge instructions reviewed and acknowledged. Pt discharged via wheelchair, accompanied by transport.

## 2017-02-09 PROBLEM — N19 RENAL FAILURE: Status: ACTIVE | Noted: 2017-01-01

## 2017-02-10 PROBLEM — K74.60 CIRRHOSIS OF LIVER WITH ASCITES: Status: ACTIVE | Noted: 2017-01-01

## 2017-02-10 PROBLEM — I95.9 HYPOTENSION: Status: ACTIVE | Noted: 2017-01-01

## 2017-02-10 PROBLEM — E87.20 METABOLIC ACIDOSIS, NORMAL ANION GAP (NAG): Status: ACTIVE | Noted: 2017-01-01

## 2017-02-10 PROBLEM — K52.9 CHRONIC DIARRHEA: Status: ACTIVE | Noted: 2017-01-01

## 2017-02-10 PROBLEM — R18.8 CIRRHOSIS OF LIVER WITH ASCITES: Status: ACTIVE | Noted: 2017-01-01

## 2017-02-10 NOTE — ED NOTES
Pain: Denies at present.    Psychosocial: Patient is calm and cooperative. Patients insight and judgement are appropriate to situation. Appears clean, well maintained, with clothing appropriate to environment. No evidence of delusions, hallucinations, or psychosis.    Neuro: Eyes open spontaneously. Awake, alert, oriented x 4. Speech clear and appropriate. Tolerating saliva secretions well. Able to follow commands, demonstrating ability to actively and appropriately communicate within context of current conversation. Symmetrical facial muscles. Moving all extremities well with no noted weakness. Adequate muscle tone present. Movement is purposeful. No evidence of impaired sensation. Responds to external stimuli with appropriate reflexes.     Airway: Bilateral chest rise and fall. RR regular and non-labored. Air entry patent and clear x 5 lobes of the lungs. No crepitus or subcutaneous emphysema noted on palpation.     Circulatory: Skin warm, dry, and pink. Apical and radial pulses strong and regular. Capillary refill/skin blanching less than 3 seconds to distal of 4 extremities. Placed on CM in NSR without ectopy.    Abdomen: Abdomen obese, soft and non-distended. Positive normo-active bowel sounds x 4 quadrants.     Urinary: Patient reports routine urination without pain, frequency, or urgency. Voids independently. Reports urine appears randall/yellow in color.    Extremities: No redness, heat, swelling, deformity, or pain.     Skin: Intact with no bruising/discolorations noted.

## 2017-02-10 NOTE — TELEPHONE ENCOUNTER
----- Message from Lucinda Roberts sent at 2/9/2017  3:59 PM CST -----  Good Afternoon,    Dr. Kimball would like to see if there is a way to have patient seen in hepatology tomorrow.     Thank you,    Lucinda Roberts

## 2017-02-10 NOTE — ASSESSMENT & PLAN NOTE
- BP on admit: 87/48 -> give 500cc IVF bolus x2 -> BP increased to SBP of   - patient chronically hypotensive, likely due to vasodilatory state 2/2 cirrhosis and third-spacing fluids. Will continue to monitor  - has midodrine on board (part of HRS mgmt)

## 2017-02-10 NOTE — ASSESSMENT & PLAN NOTE
-Previously 2/2 nexavar but with only mild improvement per patient since stopping  -Possible component to NAGMA, urine lytes pending  -Stool c. Diff EIA pending  -Stool Cx, O&P pending  -Stool WBC, osm and lytes, fecal fat pending

## 2017-02-10 NOTE — CONSULTS
Consult Note  Nephrology    Consult Requested By: Kiran Lorenzana DO  Reason for Consult: DONI    SUBJECTIVE:     History of Present Illness:  Patient is a 71 y.o. 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 He lives in [facility] with his family. Since the TACE he has reported feeling poorly with fatigue, lower extremity edema and decreased appetite. Labs from his family physician in [facility] showed elevation in creatinine to 2.5 near the end of January. This has progressively worsened therefore they traveled here overnight for further care. Patient on laboratories for his primary physician in [facility] was found with a UTI and was treated with fosfomycin for 3 days. Patient was also having low BP at home with systolic on the 70's. Does report 5-6 loose BMs/day and states this has been going on for several weeks and is slightly improved since stopping nexavar of which he had known complications of diarrhea for about 1 month.  Patient consulted to our services due to DONI and metabolic acidosis.     Past Medical History   Diagnosis Date    HCC (hepatocellular carcinoma) 5/17/2016    Hypertension      Past Surgical History   Procedure Laterality Date    Rotator cuff repair      Skin graft       Family History   Problem Relation Age of Onset    No Known Problems Mother     No Known Problems Father     No Known Problems Sister     No Known Problems Brother     No Known Problems Maternal Aunt     No Known Problems Maternal Uncle     No Known Problems Paternal Aunt     No Known Problems Paternal Uncle     No Known Problems Maternal Grandmother     No Known Problems Maternal Grandfather     No Known Problems Paternal Grandmother     No Known Problems Paternal Grandfather     Amblyopia Neg Hx     Blindness Neg Hx     Cancer Neg Hx     Cataracts Neg Hx     Diabetes Neg Hx     Glaucoma Neg Hx      Hypertension Neg Hx     Macular degeneration Neg Hx     Retinal detachment Neg Hx     Strabismus Neg Hx     Stroke Neg Hx     Thyroid disease Neg Hx      Social History   Substance Use Topics    Smoking status: Former Smoker    Smokeless tobacco: Not on file    Alcohol use Yes      Comment: socially       Review of patient's allergies indicates:  No Known Allergies     Review of Systems:  Constitutional: Negative for fever. Positive for appetite change and fatigue.  HENT: Negative for hearing loss, sore throat and mouth sores.  Eyes: Negative for photophobia, pain and visual disturbance.  Respiratory: Negative for cough, chest tightness, shortness of breath and wheezing.  Cardiovascular: Negative for chest pain, palpitations. Positive for leg swelling.  Gastrointestinal: Negative for nausea, vomiting, constipation, blood in stool and abdominal distention. Positive for diarrhea  Genitourinary: Negative for dysuria, urgency, frequency, hematuria, decreased urine volume, difficulty urinating  Musculoskeletal: Negative for back pain, joint swelling, arthralgias and gait problem.  Skin: Negative for pallor, rash and wound.  Neurological: Negative for dizziness, tremors, syncope, weakness, light-headedness and headaches.  Hematological: Negative for adenopathy. Does not bruise/bleed easily.  Psychiatric/Behavioral: Negative for confusion, sleep disturbance and dysphoric mood. The patient is not nervous/anxious.      OBJECTIVE:     Vital Signs (Most Recent)  Temp: 97 °F (36.1 °C) (02/10/17 1303)  Pulse: 64 (02/10/17 1502)  Resp: 17 (02/10/17 1331)  BP: (!) 90/52 (02/10/17 1502)  SpO2: 98 % (02/10/17 1502)    Vital Signs Range (Last 24H):  Temp:  [96.4 °F (35.8 °C)-97.3 °F (36.3 °C)]   Pulse:  [62-87]   Resp:  [16-27]   BP: ()/(48-65)   SpO2:  [95 %-100 %]     Physical Exam:  General: No acute distress, well groomed, alert and oriented x 3  HEENT: Normocephalic, atraumatic, external inspection of ears and  nose normal, moist mucous membranes, no oral ulcerations/lesions  Neck: Supple, symmetrical, trachea midline, no thyromegaly  Respiratory: Clear to auscultation bilaterally, respirations unlabored, no rales/rhonchi/wheezing  Cardiovacular: Regular rate and rhythm, S1, S2 normal, no murmurs, rubs or gallops  Gastrointestinal: Soft, non-tender, bowel sounds normal, + ascites  Musculoskeletal: No knee or ankle joint tenderness or swelling.   Extremities: No clubbing or cyanosis of bilateral upper extremities; + 1 lower extremity edema bilaterally, radial pulses 2+ bilaterally, symmetric  Skin: warm and dry; no rash on exposed skin    Laboratory:  CBC:   Recent Labs  Lab 02/10/17  0310 02/10/17  0311   WBC 3.37*  --    RBC 2.69*  --    HGB 9.9*  --    HCT 28.3* 26*   *  --    *  --    MCH 36.8*  --    MCHC 35.0  --      CMP:   Recent Labs  Lab 02/10/17  0701   *   CALCIUM 8.3*   ALBUMIN 2.7*   PROT 6.0   *   K 4.6   CO2 10*   *   BUN 91*   CREATININE 5.9*   ALKPHOS 217*   ALT 20   AST 34   BILITOT 2.1*       Diagnostic Results:  Labs: Reviewed  X-Ray: Reviewed    ASSESSMENT/PLAN:     A 71 y.o. 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 Etiology pre- renal vs. Ischemic ATN vs HRS. Patient still presenting with borderline low BP levels. Was started on HRS protocol for treatment and with mild improvement of the creatinine levels. Patient refers that he has noticed improvement of his urine output.     DONI with possible etiologies pre- renal vs. Ischemic ATN vs HRS  - Agreed with HRS protocol   - Retroperitoneal US with unremarkable kidneys   - Will continue trending renal function and urine output   - Monitor strict I/Os, daily weights  - Will continue to monitor    Metabolic acidosis  - Will order Lactic acid levels   - Start Sodium bicarbonate 1,300 mg PO TID   -  Follow up [facility]     Domenic Jauregui   Nephrology fellow PGY- 5  [phone]

## 2017-02-10 NOTE — SUBJECTIVE & OBJECTIVE
Past Medical History   Diagnosis Date    HCC (hepatocellular carcinoma) 5/17/2016    Hypertension        Past Surgical History   Procedure Laterality Date    Rotator cuff repair      Skin graft         Review of patient's allergies indicates:  No Known Allergies    No current facility-administered medications on file prior to encounter.      Current Outpatient Prescriptions on File Prior to Encounter   Medication Sig    SORAFENIB TOSYLATE (NEXAVAR ORAL) Take by mouth.    furosemide (LASIX) 20 MG tablet Take 3 tablets (60 mg total) by mouth once daily. (Patient taking differently: Take 40 mg by mouth once daily. Taking 40mg per day)    losartan (COZAAR) 50 MG tablet Take 50 mg by mouth once daily.    omeprazole (PRILOSEC) 10 MG capsule Take 10 mg by mouth once daily.    ondansetron (ZOFRAN) 4 MG tablet Take 1 tablet (4 mg total) by mouth every 6 (six) hours as needed for Nausea.    oxycodone (ROXICODONE) 5 MG immediate release tablet Take 1 tablet (5 mg total) by mouth every 6 (six) hours as needed for Pain.    spironolactone (ALDACTONE) 50 MG tablet Take 25 mg by mouth once daily.      Family History     Problem Relation (Age of Onset)    No Known Problems Mother, Father, Sister, Brother, Maternal Aunt, Maternal Uncle, Paternal Aunt, Paternal Uncle, Maternal Grandmother, Maternal Grandfather, Paternal Grandmother, Paternal Grandfather        Social History Main Topics    Smoking status: Former Smoker    Smokeless tobacco: Not on file    Alcohol use Yes      Comment: socially    Drug use: No    Sexual activity: Not on file     Review of Systems   Constitutional: Positive for fatigue. Negative for chills, diaphoresis and fever.   HENT: Negative for congestion and rhinorrhea.    Eyes: Negative for discharge and redness.   Respiratory: Negative for cough, chest tightness, shortness of breath and wheezing.    Cardiovascular: Positive for leg swelling. Negative for chest pain and palpitations.    Gastrointestinal: Positive for abdominal distention and diarrhea. Negative for abdominal pain, constipation, nausea and vomiting.   Endocrine: Negative for polydipsia and polyuria.   Genitourinary: Positive for decreased urine volume. Negative for difficulty urinating, dysuria and frequency.   Musculoskeletal: Negative for back pain and myalgias.   Skin: Negative for rash and wound.   Neurological: Negative for weakness and numbness.   Psychiatric/Behavioral: Negative for agitation and confusion.     Objective:     Vital Signs (Most Recent):  Temp: 97.3 °F (36.3 °C) (02/09/17 1920)  Pulse: 81 (02/10/17 0031)  Resp: (!) 22 (02/09/17 2346)  BP: (!) 88/55 (02/10/17 0031)  SpO2: 98 % (02/10/17 0031) Vital Signs (24h Range):  Temp:  [97.3 °F (36.3 °C)-97.4 °F (36.3 °C)] 97.3 °F (36.3 °C)  Pulse:  [81-98] 81  Resp:  [18-27] 22  SpO2:  [97 %-99 %] 98 %  BP: (87-96)/(48-59) 88/55     Weight: 64.4 kg (142 lb)  Body mass index is 22.92 kg/(m^2).    Physical Exam   Constitutional: He is oriented to person, place, and time. He appears well-developed and well-nourished. No distress.   HENT:   Head: Normocephalic and atraumatic.   Eyes: EOM are normal. Pupils are equal, round, and reactive to light. Right eye exhibits no discharge. Left eye exhibits no discharge. Scleral icterus (trace) is present.   Neck: Normal range of motion. Neck supple.   Cardiovascular: Normal rate and regular rhythm.    No murmur heard.  Pulmonary/Chest: Effort normal. No respiratory distress. He has no wheezes. He has no rales.   Abdominal: Soft. Normal appearance and bowel sounds are normal. He exhibits distension and ascites. There is no tenderness. There is no rebound and no guarding.   Musculoskeletal: He exhibits edema (JARET lower extremity edema to knees).   Neurological: He is alert and oriented to person, place, and time. No cranial nerve deficit.   Skin: Skin is warm and dry. He is not diaphoretic.   Psychiatric: He has a normal mood and  affect. His behavior is normal.        Significant Labs:   Bilirubin:   Recent Labs  Lab 01/16/17  0855 01/17/17  0722 01/18/17  0722 02/09/17 1646 02/09/17  2155   BILIDIR 1.7*  --   --   --   --    BILITOT 2.8* 2.6* 2.4* 2.8* 2.2*     CBC:   Recent Labs  Lab 02/09/17 1646 02/09/17  2155   WBC 5.65 5.38   HGB 12.0* 11.8*   HCT 34.2* 34.6*   * 103*     CMP:   Recent Labs  Lab 02/09/17 1646 02/09/17  2155   * 132*   K 4.9 4.6    112*   CO2 9* 9*   * 125*   BUN 90* 88*   CREATININE 6.3* 6.2*   CALCIUM 8.8 8.7   PROT 6.8 6.6   ALBUMIN 2.2* 2.0*   BILITOT 2.8* 2.2*   ALKPHOS 277* 283*   AST 46* 54*   ALT 28 29   ANIONGAP 14 11   EGFRNONAA 8.2* 8.4*     Urine Studies: No results for input(s): COLORU, APPEARANCEUA, PHUR, SPECGRAV, PROTEINUA, GLUCUA, KETONESU, BILIRUBINUA, OCCULTUA, NITRITE, UROBILINOGEN, LEUKOCYTESUR, RBCUA, WBCUA, BACTERIA, SQUAMEPITHEL, HYALINECASTS in the last 48 hours.    Invalid input(s): WRIGHTSUR    Significant Imaging: I have reviewed all pertinent imaging results/findings within the past 24 hours.   02/09 CxR: No acute cardiopulmonary abnormality, pulm nodule seen previously

## 2017-02-10 NOTE — ASSESSMENT & PLAN NOTE
- corrected AG 16 (borderline for NAGMA) -> improved to 17 by AM labs  - likely due to ongoing diarrhea -> determining etiology at this time  - continue to monitor with BMP q6h

## 2017-02-10 NOTE — ASSESSMENT & PLAN NOTE
-Unclear etiology, ddx includes pre-renal from dehydration, HRS, intrinsic renal, possible drug interaction  -Urine lytes, urea pending  -UA pending  -Retroperitoneal US pending  -HRS treatment started in ED with octreotide, albumin, midodrine  -Nephrology consult pending, appreciate help

## 2017-02-10 NOTE — ASSESSMENT & PLAN NOTE
-Unclear etiology, ddx includes pre-renal from dehydration, HRS, intrinsic renal, possible drug interaction  -based on urine lytes, FeNa: 0.8% indicative of pre-renal etiology  -UA: 1+ leuks but no nitrites, RBCs; 9 hyaline casts   -Retroperitoneal US (2/10/17): unremarkable kidneys  -HRS treatment started in ED with octreotide, albumin, midodrine  -Nephrology consult pending, appreciate help

## 2017-02-10 NOTE — ASSESSMENT & PLAN NOTE
-Oncologic hx per HPI  -s/p TACE 01/19/2017 and Y90 previously  -Not currently on any therapy  -Not Tx candidate per Hepatology

## 2017-02-10 NOTE — ED TRIAGE NOTES
71 year old male pt presents to the ed for an abnormal creatine level as well as a low blood pressure. Pt denies any chest pain sob or numbness. Pt presents to the ed with family.

## 2017-02-10 NOTE — ASSESSMENT & PLAN NOTE
MELD-Na score: 26 at 2/10/2017  7:01 AM  MELD score: 25 at 2/10/2017  7:01 AM  Calculated from:  Serum Creatinine: 5.9 mg/dL (Rounded to 4) at 2/10/2017  7:01 AM  Serum Sodium: 134 mmol/L at 2/10/2017  7:01 AM  Total Bilirubin: 2.1 mg/dL at 2/10/2017  7:01 AM  INR(ratio): 1.2 at 2/9/2017  4:46 PM  Age: 71 years   Not a transplant candidate given severity of disease; hepatology following

## 2017-02-10 NOTE — TELEPHONE ENCOUNTER
Received call from lab of critical lab value CO2 9.  CMP showed creatinine 6.3 with most recent creatinine 3 weeks ago <2.  Called and spoke with pt's son, Willie Jordan, notified him of pt's acute renal failure, recommended going to ER for further evaluation and treatment.

## 2017-02-10 NOTE — H&P
[facility] Hospital Medicine  History & Physical    Patient Name: Danilo Jordan  MRN: [id number]  Admission Date: 2/9/2017  Attending Physician: Nic Goss MD   Primary Care Provider: Yassine Kimball MD    [facility] Medicine Team: [facility] HOSP MED 3 John Larkin IV, MD     Patient information was obtained from patient, past medical records and ER records.     Subjective:     Principal Problem:Acute renal failure    Chief Complaint:   Chief Complaint   Patient presents with    Abdnormal Lab     Pt called by Dr. Espinoza who is covering for Dr. Kimball, pt was told to come in as he is close to Renal failure and needs fluids and meds Pt had labs drawn today        HPI: 72 y/o PMH cirrhosis, HCC, HLD, HTN who presents after being sent from clinic for acute renal failure. He reports symptoms of decreased urine output, worsening LE edema for about 2-3 weeks since his last TACE 01/19/2017. States urine looks the same otherwise. Denies any dysuria, hesitancy, fevers, chills, sweats. Does report 5-6 loose BMs/day and states this has been going on for several weeks and is slightly improved since stopping nexavar of which he had known complications of diarrhea for about 1 month. He denies any abd pain, but does report distension and ascites though notes it has been stable. He is taking aldactone and lasix at home.     Of note lives in [facility] and reports he just finished an antibiotic course (they believe it was fosfomycin but are not completely sure) for bacteria discovered in his urine.     For his HCC oncologic history below and followed by Dr. Cordero in clinic. Not a Tx candidate given extent of disease.     Oncologic History (from Dr. Pink's 01/16/2017 clinic visit):   Per patient he has a long standing history of elevated liver function tests but he has been asymptomatic. Has hepatitis panel checked several times over the years including recently and was negative. On  a recent imaging study in [facility] he was found to have liver lesions.  Had CT abdomen and pelvis done at [facility] 5/16/16 which showed at least 4 lesions.  Had Y-90 - right lobe of the liver.  Started on Nexavar 8/2016.  TACE 01/19/2017        Past Medical History   Diagnosis Date    HCC (hepatocellular carcinoma) 5/17/2016    Hypertension        Past Surgical History   Procedure Laterality Date    Rotator cuff repair      Skin graft         Review of patient's allergies indicates:  No Known Allergies    No current facility-administered medications on file prior to encounter.      Current Outpatient Prescriptions on File Prior to Encounter   Medication Sig    SORAFENIB TOSYLATE (NEXAVAR ORAL) Take by mouth.    furosemide (LASIX) 20 MG tablet Take 3 tablets (60 mg total) by mouth once daily. (Patient taking differently: Take 40 mg by mouth once daily. Taking 40mg per day)    losartan (COZAAR) 50 MG tablet Take 50 mg by mouth once daily.    omeprazole (PRILOSEC) 10 MG capsule Take 10 mg by mouth once daily.    ondansetron (ZOFRAN) 4 MG tablet Take 1 tablet (4 mg total) by mouth every 6 (six) hours as needed for Nausea.    oxycodone (ROXICODONE) 5 MG immediate release tablet Take 1 tablet (5 mg total) by mouth every 6 (six) hours as needed for Pain.    spironolactone (ALDACTONE) 50 MG tablet Take 25 mg by mouth once daily.      Family History     Problem Relation (Age of Onset)    No Known Problems Mother, Father, Sister, Brother, Maternal Aunt, Maternal Uncle, Paternal Aunt, Paternal Uncle, Maternal Grandmother, Maternal Grandfather, Paternal Grandmother, Paternal Grandfather        Social History Main Topics    Smoking status: Former Smoker    Smokeless tobacco: Not on file    Alcohol use Yes      Comment: socially    Drug use: No    Sexual activity: Not on file     Review of Systems   Constitutional: Positive for fatigue. Negative for chills, diaphoresis and fever.   HENT: Negative for congestion and  rhinorrhea.    Eyes: Negative for discharge and redness.   Respiratory: Negative for cough, chest tightness, shortness of breath and wheezing.    Cardiovascular: Positive for leg swelling. Negative for chest pain and palpitations.   Gastrointestinal: Positive for abdominal distention and diarrhea. Negative for abdominal pain, constipation, nausea and vomiting.   Endocrine: Negative for polydipsia and polyuria.   Genitourinary: Positive for decreased urine volume. Negative for difficulty urinating, dysuria and frequency.   Musculoskeletal: Negative for back pain and myalgias.   Skin: Negative for rash and wound.   Neurological: Negative for weakness and numbness.   Psychiatric/Behavioral: Negative for agitation and confusion.     Objective:     Vital Signs (Most Recent):  Temp: 97.3 °F (36.3 °C) (02/09/17 1920)  Pulse: 81 (02/10/17 0031)  Resp: (!) 22 (02/09/17 2346)  BP: (!) 88/55 (02/10/17 0031)  SpO2: 98 % (02/10/17 0031) Vital Signs (24h Range):  Temp:  [97.3 °F (36.3 °C)-97.4 °F (36.3 °C)] 97.3 °F (36.3 °C)  Pulse:  [81-98] 81  Resp:  [18-27] 22  SpO2:  [97 %-99 %] 98 %  BP: (87-96)/(48-59) 88/55     Weight: 64.4 kg (142 lb)  Body mass index is 22.92 kg/(m^2).    Physical Exam   Constitutional: He is oriented to person, place, and time. He appears well-developed and well-nourished. No distress.   HENT:   Head: Normocephalic and atraumatic.   Eyes: EOM are normal. Pupils are equal, round, and reactive to light. Right eye exhibits no discharge. Left eye exhibits no discharge. Scleral icterus (trace) is present.   Neck: Normal range of motion. Neck supple.   Cardiovascular: Normal rate and regular rhythm.    No murmur heard.  Pulmonary/Chest: Effort normal. No respiratory distress. He has no wheezes. He has no rales.   Abdominal: Soft. Normal appearance and bowel sounds are normal. He exhibits distension and ascites. There is no tenderness. There is no rebound and no guarding.   Musculoskeletal: He exhibits edema  (JARET lower extremity edema to knees).   Neurological: He is alert and oriented to person, place, and time. No cranial nerve deficit.   Skin: Skin is warm and dry. He is not diaphoretic.   Psychiatric: He has a normal mood and affect. His behavior is normal.        Significant Labs:   Bilirubin:   Recent Labs  Lab 01/16/17  0855 01/17/17  0722 01/18/17  0722 02/09/17  1646 02/09/17  2155   BILIDIR 1.7*  --   --   --   --    BILITOT 2.8* 2.6* 2.4* 2.8* 2.2*     CBC:   Recent Labs  Lab 02/09/17  1646 02/09/17  2155   WBC 5.65 5.38   HGB 12.0* 11.8*   HCT 34.2* 34.6*   * 103*     CMP:   Recent Labs  Lab 02/09/17  1646 02/09/17  2155   * 132*   K 4.9 4.6    112*   CO2 9* 9*   * 125*   BUN 90* 88*   CREATININE 6.3* 6.2*   CALCIUM 8.8 8.7   PROT 6.8 6.6   ALBUMIN 2.2* 2.0*   BILITOT 2.8* 2.2*   ALKPHOS 277* 283*   AST 46* 54*   ALT 28 29   ANIONGAP 14 11   EGFRNONAA 8.2* 8.4*     Urine Studies: No results for input(s): COLORU, APPEARANCEUA, PHUR, SPECGRAV, PROTEINUA, GLUCUA, KETONESU, BILIRUBINUA, OCCULTUA, NITRITE, UROBILINOGEN, LEUKOCYTESUR, RBCUA, WBCUA, BACTERIA, SQUAMEPITHEL, HYALINECASTS in the last 48 hours.    Invalid input(s): WRIGHTSUR    Significant Imaging: I have reviewed all pertinent imaging results/findings within the past 24 hours.   02/09 CxR: No acute cardiopulmonary abnormality, pulm nodule seen previously      Assessment/Plan:     * Acute renal failure  -Unclear etiology, ddx includes pre-renal from dehydration, HRS, intrinsic renal, possible drug interaction  -Urine lytes, urea pending  -UA pending  -Retroperitoneal US pending  -HRS treatment started in ED with octreotide, albumin, midodrine  -Nephrology consult pending, appreciate help    Essential hypertension  -Holding home losartan 2/2 DONI    HCC (hepatocellular carcinoma)  -Oncologic hx per HPI  -s/p TACE 01/19/2017 and Y90 previously  -Not currently on any therapy  -Not Tx candidate per Hepatology    Cirrhosis of liver  with ascites  See HCC section    Chronic diarrhea  -Previously 2/2 nexavar but with only mild improvement per patient since stopping  -Possible component to NAGMA, urine lytes pending  -Stool c. Diff EIA pending  -Stool Cx, O&P pending  -Stool WBC, osm and lytes, fecal fat pending    Metabolic acidosis, normal anion gap (NAG)  -Corrected gap 16 borderline for NAGMA, given clinical picture suspect some non-gap component  -pH 7.319/CO2 19  -Will start bicarb gtt 1mEq @ 100    VTE Risk Mitigation         Ordered     heparin (porcine) injection 5,000 Units  Every 12 hours     Route:  Subcutaneous        02/09/17 2345     Medium Risk of VTE  Once      02/10/17 0041        John Larkin IV, MD  Department of Hospital Medicine   [facility]                      02/10/2017                             STAFF PHYSICIAN NOTE                                   Attending Attestation for Rounds with Resident  I have reviewed and concur with the resident's history, physical, assessment, and plan.  I have personally interviewed and examined the patient at bedside and agree with the resident's findings.                                  ________________________________________                                     REASON FOR ADMISSION:     Patient is 71 y.o.male    Body mass index is 22.92 kg/(m^2).,  Acute renal failure

## 2017-02-10 NOTE — TELEPHONE ENCOUNTER
Received call from Flory in the International Dept. Flory stated Dr Kimball is requesting an appointment for the patient with Dr Cordero.   Flory informed that Dr Cordero is out of town. Will arrange an appt for the patient. Spoke with Dr Soto. The patient will be seen in clinic on tomorrow 2/10/17 at 3 pm in the Hepatology dept with Reina Liz/Dr Soto. Voice mail message left for Flory ext [phone] with the appt time.DB

## 2017-02-10 NOTE — CONSULTS
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    He lives in [place] with his family. Since the TACE he has reported feeling poorly with fatigue, lower extremity edema and decreased appetite. Labs from his family physician in [place] showed elevation in creatinine to 2.5 near the end of January. This has progressively worsened therefore they traveled here overnight for further care. He reports making urine and denies any recent medication changes. He did take an antibiotics 2-3 days ago for UTI. He believes it was fosphomycin.     Constitutional: no fever, chills  Eyes: no visual changes   ENT: no sore throat or dysphagia  Respiratory: no cough or shortness of breath   Cardiovascular: no chest pain or palpitations   Gastrointestinal: as per HPI  Hematologic/Lymphatic: no easy bruising or lymphadenopathy   Musculoskeletal: no arthralgias or myalgias   Neurological: no seizures, tremors or change in mental status  Behavioral/Psych: no auditory or visual hallucinations    Past Medical History   Diagnosis Date    HCC (hepatocellular carcinoma) 5/17/2016    Hypertension        Past Surgical History   Procedure Laterality Date    Rotator cuff repair      Skin graft         Family History   Problem Relation Age of Onset    No Known Problems Mother     No Known Problems Father     No Known Problems Sister     No Known Problems Brother     No Known Problems Maternal Aunt     No Known Problems Maternal Uncle     No Known Problems Paternal Aunt     No Known Problems Paternal Uncle     No Known Problems Maternal Grandmother     No Known Problems Maternal Grandfather     No Known Problems Paternal Grandmother     No Known Problems Paternal Grandfather     Amblyopia Neg Hx      "Blindness Neg Hx     Cancer Neg Hx     Cataracts Neg Hx     Diabetes Neg Hx     Glaucoma Neg Hx     Hypertension Neg Hx     Macular degeneration Neg Hx     Retinal detachment Neg Hx     Strabismus Neg Hx     Stroke Neg Hx     Thyroid disease Neg Hx        Review of patient's allergies indicates:  No Known Allergies    Social History     Social History    Marital status:      Spouse name: N/A    Number of children: N/A    Years of education: N/A     Social History Main Topics    Smoking status: Former Smoker    Smokeless tobacco: Not on file    Alcohol use Yes      Comment: socially    Drug use: No    Sexual activity: Not on file     Other Topics Concern    Not on file     Social History Narrative        albumin human 25%  25 g Intravenous Q6H    heparin (porcine)  5,000 Units Subcutaneous Q12H    midodrine  10 mg Oral TID    octreotide  50 mcg Intravenous Q8H    pantoprazole  40 mg Oral Daily       Visit Vitals    BP 96/60    Pulse 63    Temp 97 °F (36.1 °C) (Oral)    Resp 17    Ht 5' 6" (1.676 m)    Wt 64.4 kg (142 lb)    SpO2 99%    BMI 22.92 kg/m2     General appearance: alert, appears stated age and cooperative.  Eyes: negative findings: conjunctivae and sclerae normal.  Throat: lips, mucosa, and tongue normal.  Lungs: clear to auscultation bilaterally.  Heart: S1, S2 normal.  Abdomen: soft, non-tender; bowel sounds normal; no masses, no organomegaly, some ascites appreciated.   Skin: spider angiomata, no palmar erythema, no jaundice.  Lymph nodes: No cervical or supraclavicular adenopathy appreciated  Neurologic: Mental status: Alert, oriented, thought content appropriate.  Extremities: bilateral lower extremity edema, no muscle wasting appreciated.      Laboratory:    Recent Labs  Lab 02/09/17  1646 02/09/17  2155 02/10/17  0701   * 132* 134*   K 4.9 4.6 4.6    112* 112*   CO2 9* 9* 10*   BUN 90* 88* 91*   CREATININE 6.3* 6.2* 5.9*   CALCIUM 8.8 8.7 8.3* "   PROT 6.8 6.6 6.0   BILITOT 2.8* 2.2* 2.1*   ALKPHOS 277* 283* 217*   ALT 28 29 20   AST 46* 54* 34         Recent Labs  Lab 02/09/17  1646 02/09/17  2155 02/10/17  0310 02/10/17  0311   WBC 5.65 5.38 3.37*  --    HGB 12.0* 11.8* 9.9*  --    HCT 34.2* 34.6* 28.3* 26*   * 103* 102*  --          Recent Labs  Lab 02/09/17  1646   INR 1.2       MELD-Na score: 26 at 2/10/2017  7:01 AM  MELD score: 25 at 2/10/2017  7:01 AM  Calculated from:  Serum Creatinine: 5.9 mg/dL (Rounded to 4) at 2/10/2017  7:01 AM  Serum Sodium: 134 mmol/L at 2/10/2017  7:01 AM  Total Bilirubin: 2.1 mg/dL at 2/10/2017  7:01 AM  INR(ratio): 1.2 at 2/9/2017  4:46 PM  Age: 71 years    Assessment:  This is a 71 year old male with 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    1. Acute kidney injury - possible contrast induced after TACE with on-going nephrotoxic agents (lasix, aldactone, ARB) in the setting of volume depletion (documented hypotension).   2. HCC with underlying cryptogenic cirrhosis - outside of erika criteria.     Plan:  1. Agree with nephrology consultation.   2. HTS protocol initiated.   3. We will continue to follow with you.     Viky Phelps MD PGY-5  Gastroenterology Fellow  Pager# [phone]

## 2017-02-10 NOTE — ED PROVIDER NOTES
Encounter Date: 2/9/2017       History     Chief Complaint   Patient presents with    Abdnormal Lab     Pt called by Dr. Espinoza who is covering for Dr. Kimball, pt was told to come in as he is close to Renal failure and needs fluids and meds Pt had labs drawn today     Review of patient's allergies indicates:  No Known Allergies  HPI Comments: 71 year old gentleman from [facility] with a past medical history significant for HCC s/p chemoembolization 1/19 and hypotension who presents today after finding of acute renal failure on routine labs. The patient states that he has had decrease urine output over the past several days, increased swelling of his lower extremities and abdomen. He states that he has a feeling of faintness when rising from sitting to standing associated with his episodes of hypotension. The patient states that prior to his diagnosis of HCC he had no chronic medical problems. He was recently tx with TACE for HCC last month, and also finished unknown Abx while in [facility] recently. HE has had chronic diarrhea and poor PO intake x weeks. He worked as a  with no occupational exposures. He has a remote 20 pack year history of smoking and does not consume alcohol.    The history is provided by the patient, a relative and medical records.     Past Medical History   Diagnosis Date    HCC (hepatocellular carcinoma) 5/17/2016    Hypertension      Past Medical History Pertinent Negatives   Diagnosis Date Noted    COPD (chronic obstructive pulmonary disease) 5/16/2016    Diabetes mellitus, type 2 5/16/2016    Thyroid disease 5/16/2016     Past Surgical History   Procedure Laterality Date    Rotator cuff repair      Skin graft       Family History   Problem Relation Age of Onset    No Known Problems Mother     No Known Problems Father     No Known Problems Sister     No Known Problems Brother     No Known Problems Maternal Aunt     No Known Problems Maternal Uncle     No Known  Problems Paternal Aunt     No Known Problems Paternal Uncle     No Known Problems Maternal Grandmother     No Known Problems Maternal Grandfather     No Known Problems Paternal Grandmother     No Known Problems Paternal Grandfather     Amblyopia Neg Hx     Blindness Neg Hx     Cancer Neg Hx     Cataracts Neg Hx     Diabetes Neg Hx     Glaucoma Neg Hx     Hypertension Neg Hx     Macular degeneration Neg Hx     Retinal detachment Neg Hx     Strabismus Neg Hx     Stroke Neg Hx     Thyroid disease Neg Hx      Social History   Substance Use Topics    Smoking status: Former Smoker    Smokeless tobacco: Not on file    Alcohol use Yes      Comment: socially     Review of Systems   Constitutional: Positive for activity change, appetite change and fatigue.   HENT: Negative for rhinorrhea and sore throat.    Eyes: Negative for photophobia and visual disturbance.   Respiratory: Positive for shortness of breath. Negative for cough, chest tightness and wheezing.    Cardiovascular: Negative for chest pain, palpitations and leg swelling.   Gastrointestinal: Positive for abdominal distention. Negative for abdominal pain, anal bleeding, blood in stool, constipation, diarrhea, nausea and vomiting.   Endocrine: Negative for polydipsia and polyuria.   Genitourinary: Positive for decreased urine volume. Negative for difficulty urinating, dysuria, frequency, hematuria and urgency.   Musculoskeletal: Negative for arthralgias, joint swelling and myalgias.   Neurological: Positive for dizziness and light-headedness. Negative for seizures, facial asymmetry, speech difficulty and headaches.   Psychiatric/Behavioral: Negative for agitation, behavioral problems, confusion and sleep disturbance. The patient is not nervous/anxious.        Physical Exam   Initial Vitals   BP Pulse Resp Temp SpO2   02/09/17 1920 02/09/17 1920 02/09/17 1920 02/09/17 1920 02/09/17 1920   87/48 87 18 97.3 °F (36.3 °C) 97 %     Physical  Exam    Constitutional: He appears well-developed and well-nourished. No distress.   HENT:   Head: Normocephalic.   Mouth/Throat: No oropharyngeal exudate.   Eyes: EOM are normal. Pupils are equal, round, and reactive to light. Right eye exhibits no discharge. Left eye exhibits no discharge. No scleral icterus.   Neck: Normal range of motion. Neck supple. No tracheal deviation present. No JVD present.   Cardiovascular: Normal rate and regular rhythm. Exam reveals no gallop.    No murmur heard.  Pulmonary/Chest: Breath sounds normal. No respiratory distress. He has no wheezes. He exhibits no tenderness.   Abdominal: Bowel sounds are normal. He exhibits distension. There is no tenderness. There is no rebound and no guarding.   Musculoskeletal: Normal range of motion. He exhibits edema. He exhibits no tenderness.   Lymphadenopathy:     He has no cervical adenopathy.   Neurological: He is alert and oriented to person, place, and time. He has normal strength. No cranial nerve deficit.   Skin: Skin is warm and dry. No erythema.   Psychiatric: He has a normal mood and affect. His behavior is normal. Judgment and thought content normal.         ED Course   Procedures  Labs Reviewed   CBC W/ AUTO DIFFERENTIAL - Abnormal; Notable for the following:        Result Value    RBC 3.16 (*)     Hemoglobin 11.8 (*)     Hematocrit 34.6 (*)      (*)     MCH 37.3 (*)     RDW 16.1 (*)     Platelets 103 (*)     Lymph # 0.7 (*)     Lymph% 12.5 (*)     Mono% 15.6 (*)     All other components within normal limits   COMPREHENSIVE METABOLIC PANEL - Abnormal; Notable for the following:     Sodium 132 (*)     Chloride 112 (*)     CO2 9 (*)     Glucose 125 (*)     BUN, Bld 88 (*)     Creatinine 6.2 (*)     Albumin 2.0 (*)     Total Bilirubin 2.2 (*)     Alkaline Phosphatase 283 (*)     AST 54 (*)     eGFR if  9.7 (*)     eGFR if non  8.4 (*)     All other components within normal limits   PHOSPHORUS -  Abnormal; Notable for the following:     Phosphorus 7.0 (*)     All other components within normal limits   CLOSTRIDIUM DIFFICILE   CULTURE, STOOL   MAGNESIUM   URINALYSIS   SODIUM, URINE, RANDOM   CREATININE, URINE, RANDOM   POTASSIUM, URINE, RANDOM   CHLORIDE, URINE, RANDOM   PROTEIN / CREATININE RATIO, URINE   MA'S STAIN, URINE RANDOM   LACTIC ACID, PLASMA   ELECTROLYTE AND OSMOLALITY PANEL, FECES   STOOL EXAM-OVA,CYSTS,PARASITES   UREA NITROGEN, URINE, RANDOM             Medical Decision Making:   History:   Old Medical Records: I decided to obtain old medical records.  Initial Assessment:   71 year old gentleman with a past medical history of HCC s/p chemoablation on 1/19 who presents after finding of acute renal failure at clinic.  Also with chronic diarrhea and poor PO intake.  Differential Diagnosis:   Pre-renal azotemia vs intrinsic renal dysfunction vs hepatorenal syndrome  Clinical Tests:   Lab Tests: Ordered and Reviewed  Radiological Study: Ordered and Reviewed  ED Management:  - CBC, CMP, mag, phos, EKG  - 500 ml bolus of NS    Other:   I have discussed this case with another health care provider.       APC / Resident Notes:   - CBC stable  - CMP with creatinine 6.3, BUN elevated, potassium WNL  - cxr with no acute findings  - EKG stable  - ordered octreotide, albumin, midodrine, urine creatinine, Na and additional 500 ml NS bolus per IM  - will admit to IM for inpatient management of acute renal failure in setting of HCC. No indication for emergent HD, will consult Renal and Hepatology as inpatient.           Attending Attestation:   Physician Attestation Statement for Resident:  As the supervising MD   Physician Attestation Statement: I have personally seen and examined this patient.   I agree with the above history. -:   As the supervising MD I agree with the above PE.    As the supervising MD I agree with the above treatment, course, plan, and disposition.   -:     Sent from clinic after  outpatient labs with DONI, h/o HCC recently tx with TACE, with chronic diarrhea, decreased PO intake and increased edema. Likely combination of pre-renal, 3rd spacing from liver dz, possible HRS. No indication for emergent HD, no resp distress or hyperkalemia, started small IVF bolus and admitted to IM for further evaluation.                    ED Course     Clinical Impression:   The primary encounter diagnosis was Renal failure. A diagnosis of Hypotension, unspecified hypotension type was also pertinent to this visit.          Ten Becerra MD  Resident  02/10/17 0050       Ten Becerra MD  Resident  02/10/17 0052       Nic Goss MD  02/10/17 [id number]

## 2017-02-11 PROBLEM — G93.41 ENCEPHALOPATHY, METABOLIC: Status: ACTIVE | Noted: 2017-01-01

## 2017-02-11 PROBLEM — N18.30 CKD (CHRONIC KIDNEY DISEASE) STAGE 3, GFR 30-59 ML/MIN: Status: ACTIVE | Noted: 2017-01-01

## 2017-02-11 PROBLEM — J96.01 ACUTE HYPOXEMIC RESPIRATORY FAILURE: Status: ACTIVE | Noted: 2017-01-01

## 2017-02-11 PROBLEM — R79.89 ELEVATED LACTIC ACID LEVEL: Status: ACTIVE | Noted: 2017-01-01

## 2017-02-11 PROBLEM — N17.9 AKI (ACUTE KIDNEY INJURY): Status: ACTIVE | Noted: 2017-01-01

## 2017-02-11 PROBLEM — R41.82 ALTERED MENTAL STATUS: Status: ACTIVE | Noted: 2017-01-01

## 2017-02-11 NOTE — PROGRESS NOTES
Pt arrived back to unit via bed. House Supervisor still present at pt bedside. While off unit, pt had X1 episode of emesis, concerned for aspiration. Chest x-ray ordered stat. NG to right nare advanced per MD orders. Currently clamped awaiting further orders. Additional PIV access also gained. Will continue to closely monitor pt. Pt to be transferred to ICU, awaiting room to be cleaned.

## 2017-02-11 NOTE — ASSESSMENT & PLAN NOTE
CT brain negative for bleed, with no localization on exam. intubated for airway protection   DDx include hyperuremia vs hepatic encephalopathy vs seizure vs ischemic stroke  -- will treat as hepatic coma>> ammonia 216>> place NG tube and start lactulose   -- called nephrology, placed central line will start dialysis for metabolic clearance  -- order EEG spot   -- will consider MRI for ischemic stroke.

## 2017-02-11 NOTE — SUBJECTIVE & OBJECTIVE
Past Medical History   Diagnosis Date    HCC (hepatocellular carcinoma) 5/17/2016    Hypertension        Past Surgical History   Procedure Laterality Date    Rotator cuff repair      Skin graft         Review of patient's allergies indicates:  No Known Allergies    Family History     Problem Relation (Age of Onset)    No Known Problems Mother, Father, Sister, Brother, Maternal Aunt, Maternal Uncle, Paternal Aunt, Paternal Uncle, Maternal Grandmother, Maternal Grandfather, Paternal Grandmother, Paternal Grandfather        Social History Main Topics    Smoking status: Former Smoker    Smokeless tobacco: Not on file    Alcohol use Yes      Comment: socially    Drug use: No    Sexual activity: Not on file      Review of Systems   Unable to perform ROS: Mental status change     Objective:     Vital Signs (Most Recent):  Temp: 97.6 °F (36.4 °C) (02/11/17 1215)  Pulse: 96 (02/11/17 1315)  Resp: 20 (02/11/17 1315)  BP: 118/68 (02/11/17 1315)  SpO2: 100 % (02/11/17 1315) Vital Signs (24h Range):  Temp:  [96.3 °F (35.7 °C)-97.8 °F (36.6 °C)] 97.6 °F (36.4 °C)  Pulse:  [] 96  Resp:  [12-22] 20  SpO2:  [95 %-100 %] 100 %  BP: ()/(52-95) 118/68   Weight: 65.3 kg (143 lb 15.4 oz)  Body mass index is 23.24 kg/(m^2).    No intake or output data in the 24 hours ending 02/11/17 1351    Physical Exam   Constitutional: He appears well-developed. He appears lethargic. He appears distressed (resp).   HENT:   Mouth/Throat: No oropharyngeal exudate.   Eyes: Pupils are equal, round, and reactive to light. No scleral icterus.   Neck: Normal range of motion. JVD present. No tracheal deviation present.   Cardiovascular: Normal rate and regular rhythm.    No murmur heard.  Pulmonary/Chest: Breath sounds normal. No stridor. He is in respiratory distress. He has no wheezes. He has no rales.   Abdominal: Soft. He exhibits no distension. There is no tenderness.   Musculoskeletal: He exhibits no edema or deformity.    Neurological: He appears lethargic. He displays no atrophy. No cranial nerve deficit. He exhibits normal muscle tone. He displays no seizure activity. GCS eye subscore is 2. GCS verbal subscore is 1. GCS motor subscore is 5.   Skin: No rash noted.   Vitals reviewed.      Vents:  Vent Mode: A/C (02/11/17 1246)  Ventilator Initiated: Yes (02/11/17 1231)  Set Rate: 20 bmp (02/11/17 1246)  Vt Set: 450 mL (02/11/17 1246)  Pressure Support: 0 cmH20 (02/11/17 1246)  PEEP/CPAP: 5 cmH20 (02/11/17 1246)  Oxygen Concentration (%): 100 (02/11/17 1315)  Peak Airway Pressure: 20 cmH2O (02/11/17 1246)  Total Ve: 9.13 mL (02/11/17 1246)  F/VT Ratio<105 (RSBI): (!) 43.86 (02/11/17 1246)  Lines/Drains/Airways     Central Venous Catheter Line                 Trialysis (Dialysis) Catheter 02/11/17 1320 right internal jugular less than 1 day          Drain                 Urethral Catheter 02/11/17 1309 16 Fr. less than 1 day          Airway                 Airway - Non-Surgical 02/11/17 1240 Endotracheal Tube less than 1 day          Peripheral Intravenous Line                 Peripheral IV - Single Lumen 02/10/17 0700 Left Upper Arm 1 day         Peripheral IV - Single Lumen 02/11/17 Left Forearm less than 1 day              Significant Labs:    CBC/Anemia Profile:    Recent Labs  Lab 02/09/17  2155 02/10/17  0310 02/10/17  0311 02/11/17  0439   WBC 5.38 3.37*  --  3.84*   HGB 11.8* 9.9*  --  10.3*   HCT 34.6* 28.3* 26* 28.9*   * 102*  --  80*   * 105*  --  104*   RDW 16.1* 15.9*  --  15.0*        Chemistries:    Recent Labs  Lab 02/09/17  2155 02/10/17  0701 02/11/17  0439 02/11/17  1214   * 134* 136 135*   K 4.6 4.6 4.1 4.4   * 112* 113* 113*   CO2 9* 10* 7* 8*   BUN 88* 91* 84* 90*   CREATININE 6.2* 5.9* 5.7* 6.0*   CALCIUM 8.7 8.3* 8.5* 9.1   ALBUMIN 2.0* 2.7* 3.7  --    PROT 6.6 6.0 6.7  --    BILITOT 2.2* 2.1* 2.9*  --    ALKPHOS 283* 217* 171*  --    ALT 29 20 17  --    AST 54* 34 26  --    MG 2.1  2.1 2.3  --    PHOS 7.0* 6.6* 5.7*  --        All pertinent labs within the past 24 hours have been reviewed.

## 2017-02-11 NOTE — PROGRESS NOTES
Notified on call MD of the patient's change in mental status. MD placed the patient on delirium precautions and stated that he would look over morning labs. Will continue to monitor the patient.

## 2017-02-11 NOTE — SUBJECTIVE & OBJECTIVE
Symptom/Intervention Times  Last Known Normal Date:: 02/10/17 (02/11/17 1323)  Symptom Onset Date:: 02/11/17 (02/11/17 1323)  Symptom Onset Time:: 0500 (02/11/17 1323)  Stroke Team Called Time:: 0800 (02/11/17 1323)  Stroke Team Arrival Time:: 0803 (02/11/17 1323)    Past Medical History   Diagnosis Date    HCC (hepatocellular carcinoma) 5/17/2016    Hypertension      Past Surgical History   Procedure Laterality Date    Rotator cuff repair      Skin graft       Family History   Problem Relation Age of Onset    No Known Problems Mother     No Known Problems Father     No Known Problems Sister     No Known Problems Brother     No Known Problems Maternal Aunt     No Known Problems Maternal Uncle     No Known Problems Paternal Aunt     No Known Problems Paternal Uncle     No Known Problems Maternal Grandmother     No Known Problems Maternal Grandfather     No Known Problems Paternal Grandmother     No Known Problems Paternal Grandfather     Amblyopia Neg Hx     Blindness Neg Hx     Cancer Neg Hx     Cataracts Neg Hx     Diabetes Neg Hx     Glaucoma Neg Hx     Hypertension Neg Hx     Macular degeneration Neg Hx     Retinal detachment Neg Hx     Strabismus Neg Hx     Stroke Neg Hx     Thyroid disease Neg Hx      Social History   Substance Use Topics    Smoking status: Former Smoker    Smokeless tobacco: None    Alcohol use Yes      Comment: socially     Review of patient's allergies indicates:  No Known Allergies  Medications: I have reviewed the current medication administration record.    Prescriptions Prior to Admission   Medication Sig Dispense Refill Last Dose    spironolactone (ALDACTONE) 25 MG tablet Take 25 mg by mouth once daily.       UNABLE TO FIND FOSFOMICINA 500 MG TAKE 2 CAPSULES BY MOUTH EVERY 8 HOURS   Unknown at Unknown time       Review of Systems   Constitutional: Negative for fever.   HENT: Negative.    Eyes: Negative for visual disturbance.   Respiratory: Negative for  cough and shortness of breath.    Cardiovascular: Negative for palpitations and leg swelling.   Gastrointestinal: Negative for abdominal distention and diarrhea.   Genitourinary: Negative for hematuria.   Skin: Negative.    Neurological: Negative for speech difficulty, weakness and headaches.   Psychiatric/Behavioral: Positive for confusion. Negative for agitation.     Objective:     Vital Signs (Most Recent):  Temp: 97.6 °F (36.4 °C) (02/11/17 1215)  Pulse: 96 (02/11/17 1315)  Resp: 20 (02/11/17 1315)  BP: 118/68 (02/11/17 1315)  SpO2: 100 % (02/11/17 1315)    Vital Signs Range (Last 24H):  Temp:  [96.3 °F (35.7 °C)-97.8 °F (36.6 °C)]   Pulse:  []   Resp:  [12-22]   BP: ()/(52-95)   SpO2:  [95 %-100 %]     Physical Exam   Constitutional: He appears well-developed and well-nourished.   HENT:   Head: Normocephalic and atraumatic.   Eyes: Pupils are equal, round, and reactive to light.   Cardiovascular: Normal rate.    Pulmonary/Chest: Effort normal.   Abdominal: Soft.   Musculoskeletal: He exhibits no edema or deformity.   Neurological: GCS eye subscore is 2 - to pain. GCS verbal subscore is 1 - none. GCS motor subscore is 4 - withdraws from pain.   Skin: Skin is warm and dry.   Nursing note and vitals reviewed.      Neurological Exam:   LOC: does not follow requests, obtunded and groans to sternal rub  Language: Mute  Speech: Mute  Orientation: Mute  Memory: Mute  Visual Fields (CN II): Unable to assess - no blink to threat  EOM (CN III, IV, VI): Initially with left gaze; then roving eye movements noted   Pupils (CN III, IV, VI): PERRL  Facial Sensation (CN V): Unable to assess  Facial Movement (CN VII): No clear asymmetry  Hearing (CN VIII): unable to test  Motor*: Moves all extremities. Flexion to pain in BUE. Flexion to pain in LLE, withdraws from pain in RLE  Cerebellar*: Not testable due to decreased LOC  Sensation: responds to pain in all 4 extremities  Tone: Arm-Left: spastic; Leg-Left: spastic;  Arm-Right: spastic; Leg-Right: spastic      NIH Stroke Scale:  Interval: baseline (upon arrival/admit)  Level of Consciousness: 2 - stuporous  LOC Questions: 2 - answers none correctly  LOC Commands: 2 - performs neither correctly  Best Gaze: 1 - partial gaze palsy  Visual: 0 - no visual loss  Facial Palsy: 0 - normal  Motor Left Arm: 2 - can't resist gravity  Motor Right Arm: 2 - can't resist gravity  Motor Left Le - can't resist gravity  Motor Right Le - can't resist gravity  Limb Ataxia: 0 - absent  Sensory: 0 - normal  Best Language: 3 - mute  Dysarthria: 0 - normal articulation  Extinction and Inattention: 0 - no neglect  NIH Stroke Scale Total: 18  Esha Coma Scale:  Best Eye Response: 2 - to pain  Best Motor Response: 4 - withdraws from pain  Best Verbal Response: 1 - none  [place] Coma Scale Total: 7  Modified [place] Scale:   Timeline: Prior to symptoms onset  Modified Iza Score: 1 - no significant disability        Laboratory:  CMP:   Recent Labs  Lab 17  0439 17  1214   CALCIUM 8.5* 9.1   ALBUMIN 3.7  --    PROT 6.7  --     135*   K 4.1 4.4   CO2 7* 8*   * 113*   BUN 84* 90*   CREATININE 5.7* 6.0*   ALKPHOS 171*  --    ALT 17  --    AST 26  --    BILITOT 2.9*  --      CBC:   Recent Labs  Lab 17  0439   WBC 3.84*   RBC 2.78*   HGB 10.3*   HCT 28.9*   PLT 80*   *   MCH 37.1*   MCHC 35.6     Lipid Panel: No results for input(s): CHOL, LDLCALC, HDL, TRIG in the last 168 hours.  Coagulation:   Recent Labs  Lab 17  0845   INR 1.5*     Hgb A1C: No results for input(s): HGBA1C in the last 168 hours.  TSH: No results for input(s): TSH in the last 168 hours.    Diagnostic Results:  CT Head 17  No acute infarct or hemorrhage. Possible left MCA aneurysm with areas of calcifaction.

## 2017-02-11 NOTE — PROGRESS NOTES
Progress Note  Nephrology    Admit Date: 2/9/2017   LOS: 2 days     SUBJECTIVE:     Follow-up For:  DONI     Pt acutely worsened this morning. Mental status declined.  severely acidotic   Transferred to ICU, intubated and trialysis placed.     Review of Systems:  Unable to obtain pt intubated     OBJECTIVE:     Vital Signs (Most Recent)  Temp: 97.6 °F (36.4 °C) (02/11/17 1530)  Pulse: 78 (02/11/17 1630)  Resp: (!) 23 (02/11/17 1630)  BP: (!) 108/59 (02/11/17 1630)  SpO2: 100 % (02/11/17 1630)    Vital Signs Range (Last 24H):  Temp:  [96.3 °F (35.7 °C)-97.8 °F (36.6 °C)]   Pulse:  []   Resp:  [12-27]   BP: ()/(54-95)   SpO2:  [95 %-100 %]       Intake/Output Summary (Last 24 hours) at 02/11/17 1700  Last data filed at 02/11/17 1631   Gross per 24 hour   Intake            465.4 ml   Output              110 ml   Net            355.4 ml     Physical Exam:  General: Well developed, well nourished   HEENT: Conjunctiva clear; ETT in place   Neck: No JVD noted, Supple  CV- Normal S1, S2 with no murmurs,gallops,rubs  Resp- Lungs CTA Bilaterally, Unlabored  Abdomen- NTND, BS normoactive x4 quads, soft  Extrem- No cyanosis, clubbing, edema.  Skin- No rashes, lesions, ulcers  Neuro: sedated, intubated     Laboratory:  CBC:   Recent Labs  Lab 02/11/17 0439   WBC 3.84*   RBC 2.78*   HGB 10.3*   HCT 28.9*   PLT 80*   *   MCH 37.1*   MCHC 35.6     CMP:   Recent Labs  Lab 02/11/17  0439 02/11/17  1214   * 118*   CALCIUM 8.5* 9.1   ALBUMIN 3.7  --    PROT 6.7  --     135*   K 4.1 4.4   CO2 7* 8*   * 113*   BUN 84* 90*   CREATININE 5.7* 6.0*   ALKPHOS 171*  --    ALT 17  --    AST 26  --    BILITOT 2.9*  --      ABGs:   Recent Labs  Lab 02/11/17  1402   PH 7.276*   PCO2 23.1*   HCO3 10.7*   POCSATURATED 91*   BE -16       Recent Labs  Lab 02/11/17  1216   COLORU Yellow   SPECGRAV 1.010   PHUR 6.0   PROTEINUA 1+*   BACTERIA Few*   NITRITE Negative   LEUKOCYTESUR 3+*   UROBILINOGEN Negative    HYALINECASTS 9*       Diagnostic Results:  Labs: Reviewed  X-Ray: Reviewed    ASSESSMENT/PLAN:     A 71 y.o. 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 Etiology pre- renal vs. Ischemic ATN vs HRS.     Trnasferred to ICU today for severe metabolic acidosis and AMS s/p intubation.      DONI with possible etiologies pre- renal vs. Ischemic ATN vs HRS  -  Hold midodrine and start levophed, MAP goal >70   - Retroperitoneal US with unremarkable kidneys   - will start CRRT with no volume removal      Metabolic acidosis  - HCO 8 today  - 7.27/23/68 venous gas on vent   - will start CRRT     Alejandra Robertson MD   Nephrology fellow   Pager [phone]

## 2017-02-11 NOTE — PROGRESS NOTES
"Rapid Response called at 8:00 AM    Patient noted to have seizure-like activity by nurse: tremors of bilateral lower limbs, "eyes rolled back of head", unable to follow commands and non-responsive to pain on right side.   VS were wnl.   ABG: pH: 7.31/pCO2: 16.8/pO2: 106/HCO3: 8.5 on RA  2mg IV ativan administered  Patient taken to stat CT head -> r/o hemorrhage.     Of note, patient was noted to be less alert during the night, but responsive and able to follow commands, able to ambulate. The decreased alertness at the time was attributed to delirium, given it was his first night in hospital, and he didn't have any family in the room.     His worsening encephalopathy today is most likely due to metabolic acidosis (2/2 severe renal dysfunction - RTA vs. ATN) vs. Hepatic etiology (elevated ammonia). He had 1 episode of emesis while he was getting his head CT so r/o aspiration. Corrected A Lactic acid levels were wnl. UOP has been low (300cc in first 12hrs and 2 episodes overnight).     Patient will be taken to M-ICU for further eval and management of his altered mental status.     Family (daughter and son in the room) notified and updated on today's events. Questions answered. Family notes that he has had significantly decreased oral intake in the last few days. Son notes that "he is ready to go"    Kristin Sommer MD  Internal Medicine, PGY-1  [phone]    "

## 2017-02-11 NOTE — NURSING TRANSFER
Nursing Transfer Note      2/11/2017     Transfer To: 3079 from 903    Transfer via bed    Transfer with: 2L O2, cardiac monitoring, NG tube to right nare, 2 PIVs, IV pole with zosyn infusing.     Transported by YOLIE De Jesus and YOLIE Patiño    Medicines sent: n/a    Chart send with patient: Yes    Notified: daughter, son    Patient reassessed at: n/a pt unresponsive at this time    Upon arrival to floor: cardiac monitor applied, call bell in reach and bed in lowest position, YOLIE Pollard notified of pt's arrival and aided in pt transfer.

## 2017-02-11 NOTE — PLAN OF CARE
Problem: Patient Care Overview  Goal: Plan of Care Review  Outcome: Ongoing (interventions implemented as appropriate)  Pt to ICU this shfit due to acute encephalopathy. Intubated upon arrival to unit due to inability to protect airway. Trialysis placed to right IJ for planned CRRT. CRRT orders placed, awaiting start at this time. VS and assessment per flowsheet. POC reviewed with family at bedside. All questions and concerns addressed. WCTM

## 2017-02-11 NOTE — PROGRESS NOTES
Report called to YOLIE Pollard in CM-ICU. Pt to be transferred. Pt still unresponsive at this time, though VSs closely monitored and stable at this time.

## 2017-02-11 NOTE — PROGRESS NOTES
Time Stroke Code initiated:0801  Stroke team Arrival time:0804  Stroke Code activation triggers: AMS , eyes rolling back in his head  Vascular Neurologist you spoke with: Dr. Shell at bed side    Arrived at CT: 0815  CT completed: 0825 read by Dr. Moreno at 0825    tPA decision: 0830 decision not to give tPA  tPA bolus (mg and time):  tPA infusion (mg and time):  tPA end time:    IR decision:  IR arrival:  IR end time:     Pt vomited in hallway outside of CT scan, returned to CT and suctioned. Pt then transported back to [facility] . Primary MD and CCS MD at bedside. Pt to transfer to CMICU under CCS when bed ready    Pt transferred to: room 903 at  0840r emained with patient, labs drawn, ng tube placed, family updated.   Report given to: Nubia URBANO nursing supervisor, will remain with patient until transferred to CMICU

## 2017-02-11 NOTE — ASSESSMENT & PLAN NOTE
- Not likely due to stroke  - DDx includes metabolic encephalopathy vs seizure    He has multiple medical issues including metabolic acidosis who had worsening mental status. His exam was non-focal, but he had increased tone throughout with eyes rolling up. His symptoms were concerning for a seizure, so I gave 2mg of ativan with minimal improvement (started to open eyes, but remained non-responsive). CT head was unremarkable for hemorrhage or ischemic stroke. His change in mental status is likely metabolic, rather than stroke related.     No further workup is indicated from a Vascular Neurology perspective. Recommend admit to MICU for higher level of care.    Patient was seen and examined and images were reviewed with Dr. Moreno.

## 2017-02-11 NOTE — PROCEDURES
[facility] ICU Fellow Endotracheal Intubation Procedure Note    02/11/2017    Avel Tapia Nikki    3079/3079 A    Attending present in the room: Pulm/CC       This procedure has been fully reviewed with the patient and written informed consent has been obtained. yes.      Mallampati Score: II      Indication for endotracheal intubation: impending airway compromise.      Specific indication for intubation:  impending respiratory failure    Urgency of intubation:  urgent        Pre-oxygenation device used: nasal cannula,  bag valve mask    Sedative medications used    Etomidate:15      Paralytic medication used    Rocuronium:50    Other medications used None     Ventilation between medication administration and laryngoscopy:  bag valve mask      Position of patient during intubation: supine in sniffing position    Laryngoscopy device used on initial intubation attempt:    Direct       Type and size of blade used on initial intubation attempt: Curved 4       Size of endotracheal tube: 8       Endotracheal tube location:       At the lips:23      ETT location confirmed by CXR, EtCO2, Auscultation       Cormack-Lehane Glottic view on first Attempt: I    Airway description: easy    Complications: None     Number of attempts: 1.    Sats 100%, Teeth unchanged. No complications.     Luke Love  [facility] Pulmonary/Critical Care Fellow- PGY VII

## 2017-02-11 NOTE — PROGRESS NOTES
Shortly after, Core personnel showed to pt bedside, verbalized to activate stroke code. Pt to be transferred with X3 staff assistance to CT for brain imaging to r/o stroke. Pt placed on cardiac monitor, 2L n/c, and ambu bag present in case of emergency. Prior to transfer 2mg ativan X1 dose administered. Awaiting further orders.

## 2017-02-11 NOTE — PROGRESS NOTES
[facility]  Critical Care Medicine  Progress Note    Patient Name: Danilo Jordan  MRN: [id number]  Admission Date: 2/9/2017  Hospital Length of Stay: 2 days  Code Status: Full Code  Attending Provider: Cain Alvares*  Primary Care Provider: Yassine Kimball MD   Principal Problem: Encephalopathy, metabolic    Subjective:     HPI:  72 y/o PMH cirrhosis, HCC, HLD, HTN who presents after being sent from clinic for acute renal failure. He reports symptoms of decreased urine output, worsening LE edema for about 2-3 weeks since his last TACE 01/19/2017. States urine looks the same otherwise. Denies any dysuria, hesitancy, fevers, chills, sweats. Does report 5-6 loose BMs/day and states this has been going on for several weeks and is slightly improved since stopping nexavar of which he had known complications of diarrhea for about 1 month. He denies any abd pain, but does report distension and ascites though notes it has been stable. He is taking aldactone and lasix at home.     Of note lives in [facility] and reports he just finished an antibiotic course (they believe it was fosfomycin but are not completely sure) for bacteria discovered in his urine.     For his HCC oncologic history below and followed by Dr. Cordero in clinic. Not a Tx candidate given extent of disease.     Oncologic History (from Dr. Pink's 01/16/2017 clinic visit):   Per patient he has a long standing history of elevated liver function tests but he has been asymptomatic. Has hepatitis panel checked several times over the years including recently and was negative. On a recent imaging study in [facility] he was found to have liver lesions.  Had CT abdomen and pelvis done at [facility] 5/16/16 which showed at least 4 lesions.  Had Y-90 - right lobe of the liver.  Started on Nexavar 8/2016.  TACE 01/19/2017        Hospital/ICU Course:  2/10 He was admitted for DONI in a patient with HCC cirrhosis s/p TACE. Also  has chronic diarrhea. Will determine etiology of both, and manage accordingly.  2/11 rapid response called for AMS, stroke code also with negative CT for head bleed. Given 2 mg ativan for possible seizure by stroke team. Admitted to ICU for profound encephalopathy, acidosis for airway protection.         Past Medical History   Diagnosis Date    HCC (hepatocellular carcinoma) 5/17/2016    Hypertension        Past Surgical History   Procedure Laterality Date    Rotator cuff repair      Skin graft         Review of patient's allergies indicates:  No Known Allergies    Family History     Problem Relation (Age of Onset)    No Known Problems Mother, Father, Sister, Brother, Maternal Aunt, Maternal Uncle, Paternal Aunt, Paternal Uncle, Maternal Grandmother, Maternal Grandfather, Paternal Grandmother, Paternal Grandfather        Social History Main Topics    Smoking status: Former Smoker    Smokeless tobacco: Not on file    Alcohol use Yes      Comment: socially    Drug use: No    Sexual activity: Not on file      Review of Systems   Unable to perform ROS: Mental status change     Objective:     Vital Signs (Most Recent):  Temp: 97.6 °F (36.4 °C) (02/11/17 1215)  Pulse: 96 (02/11/17 1315)  Resp: 20 (02/11/17 1315)  BP: 118/68 (02/11/17 1315)  SpO2: 100 % (02/11/17 1315) Vital Signs (24h Range):  Temp:  [96.3 °F (35.7 °C)-97.8 °F (36.6 °C)] 97.6 °F (36.4 °C)  Pulse:  [] 96  Resp:  [12-22] 20  SpO2:  [95 %-100 %] 100 %  BP: ()/(52-95) 118/68   Weight: 65.3 kg (143 lb 15.4 oz)  Body mass index is 23.24 kg/(m^2).    No intake or output data in the 24 hours ending 02/11/17 1351    Physical Exam   Constitutional: He appears well-developed. He appears lethargic. He appears distressed (resp).   HENT:   Mouth/Throat: No oropharyngeal exudate.   Eyes: Pupils are equal, round, and reactive to light. No scleral icterus.   Neck: Normal range of motion. JVD present. No tracheal deviation present.   Cardiovascular:  Normal rate and regular rhythm.    No murmur heard.  Pulmonary/Chest: Breath sounds normal. No stridor. He is in respiratory distress. He has no wheezes. He has no rales.   Abdominal: Soft. He exhibits no distension. There is no tenderness.   Musculoskeletal: He exhibits no edema or deformity.   Neurological: He appears lethargic. He displays no atrophy. No cranial nerve deficit. He exhibits normal muscle tone. He displays no seizure activity. GCS eye subscore is 2. GCS verbal subscore is 1. GCS motor subscore is 5.   Skin: No rash noted.   Vitals reviewed.      Vents:  Vent Mode: A/C (02/11/17 1246)  Ventilator Initiated: Yes (02/11/17 1231)  Set Rate: 20 bmp (02/11/17 1246)  Vt Set: 450 mL (02/11/17 1246)  Pressure Support: 0 cmH20 (02/11/17 1246)  PEEP/CPAP: 5 cmH20 (02/11/17 1246)  Oxygen Concentration (%): 100 (02/11/17 1315)  Peak Airway Pressure: 20 cmH2O (02/11/17 1246)  Total Ve: 9.13 mL (02/11/17 1246)  F/VT Ratio<105 (RSBI): (!) 43.86 (02/11/17 1246)  Lines/Drains/Airways     Central Venous Catheter Line                 Trialysis (Dialysis) Catheter 02/11/17 1320 right internal jugular less than 1 day          Drain                 Urethral Catheter 02/11/17 1309 16 Fr. less than 1 day          Airway                 Airway - Non-Surgical 02/11/17 1240 Endotracheal Tube less than 1 day          Peripheral Intravenous Line                 Peripheral IV - Single Lumen 02/10/17 0700 Left Upper Arm 1 day         Peripheral IV - Single Lumen 02/11/17 Left Forearm less than 1 day              Significant Labs:    CBC/Anemia Profile:    Recent Labs  Lab 02/09/17  2155 02/10/17  0310 02/10/17  0311 02/11/17  0439   WBC 5.38 3.37*  --  3.84*   HGB 11.8* 9.9*  --  10.3*   HCT 34.6* 28.3* 26* 28.9*   * 102*  --  80*   * 105*  --  104*   RDW 16.1* 15.9*  --  15.0*        Chemistries:    Recent Labs  Lab 02/09/17  2155 02/10/17  0701 02/11/17  0439 02/11/17  1214   * 134* 136 135*   K 4.6 4.6 4.1  4.4   * 112* 113* 113*   CO2 9* 10* 7* 8*   BUN 88* 91* 84* 90*   CREATININE 6.2* 5.9* 5.7* 6.0*   CALCIUM 8.7 8.3* 8.5* 9.1   ALBUMIN 2.0* 2.7* 3.7  --    PROT 6.6 6.0 6.7  --    BILITOT 2.2* 2.1* 2.9*  --    ALKPHOS 283* 217* 171*  --    ALT 29 20 17  --    AST 54* 34 26  --    MG 2.1 2.1 2.3  --    PHOS 7.0* 6.6* 5.7*  --        All pertinent labs within the past 24 hours have been reviewed.      Assessment/Plan:     Neuro  * Encephalopathy, metabolic  CT brain negative for bleed, with no localization on exam. intubated for airway protection   DDx include hyperuremia vs hepatic encephalopathy vs seizure vs ischemic stroke  -- will treat as hepatic coma>> ammonia 216>> place NG tube and start lactulose   -- called nephrology, placed central line will start dialysis for metabolic clearance  -- order EEG spot   -- will consider MRI for ischemic stroke.        Pulmonary  Acute hypoxemic respiratory failure  Intubated for airway protection       Cardiac  Essential hypertension  Hold home antihypertensive for now after intubation.     Renal  Acute renal failure  minimal UOP since last night.   Acute decline, treated as HRS in the floor, will continue albumin, midodrine and octreotide>> appreciate nephrology help and response .   -- will start dialysis for metabolic ( sever acidosis and clinical hyperuremia )      Metabolic acidosis, normal anion gap (NAG)  Likely related to DONI>> worsens today with elevated LA.       Elevated lactic acid level  Sent for full septic work up >> blood, urine, resp Cxs   -- start broad zosyn and Vanc>> Hx of UTI on ABx 3 days ago.   -- will scan his abdomen if still elevated.   -- other DDx seizure or cancer>> will trend.      Hem/Onc  HCC (hepatocellular carcinoma)  Followed by hepatology.   -- will trend labs for now.   -- consider US abdomen to evaluate for obstruction.     Critical Care Medicine Daily Checklist:    A: Awake: RASS Goal/Actual Goal:    Actual:     B: Spontaneous  Breathing Trial Performed?     C: SAT & SBT Coordinated?                      D: Delirium: CAM-ICU     E: Early Mobility Performed? No   F: Feeding Goal:    Status:     Current Diet Order   Procedures    Diet NPO      AS: Analgesia/Sedation Precedex    T: Thromboembolic Prophylaxis Heparin 5000 Q8 > high risk    H: HOB > 300 Yes   U: Stress Ulcer Prophylaxis (if needed) PPI   G: Glucose Control yes   B: Bowel Function Stool Occurrence: 1   I: Indwelling Catheter (Lines & Romero) Necessity Left IJ central line, romero's cathter    D: De-escalation of Antimicrobials/Pharmacotherapies Not yet    Plan for the day/ETD     Code Status:  Family/Goals of Care: Full Code         ESTIVEN Jefferson  Critical Care Medicine  [facility]

## 2017-02-11 NOTE — PROGRESS NOTES
Upon entry to room for bedside shift report, pt appeared to be having seizure like activity. Pt unresponsive, eyes rolling in back of head, and not tracking any verbal commands or movement. Pt breathing effort increased with noticeable pursed lip breathing and abdominal accessory muscle use. VSs obtained and all currently stable. Fine tremors noted to all extremities. BS obtained and WNL. JULITA Patiño notified, core call initiated.

## 2017-02-11 NOTE — ASSESSMENT & PLAN NOTE
minimal UOP since last night.   Acute decline, treated as HRS in the floor, will continue albumin, midodrine and octreotide>> appreciate nephrology help and response .   -- will start dialysis for metabolic ( sever acidosis and clinical hyperuremia )

## 2017-02-11 NOTE — PROGRESS NOTES
Spoke with Dr. Patrick on call with IM[facility] to notify of critical lactic acid 3.7. No new orders, pt in process of being transferred to unit.

## 2017-02-11 NOTE — PROCEDURES
"Danilo Jordan is a 71 y.o. male patient.    Temp: 97.6 °F (36.4 °C) (02/11/17 1215)  Pulse: 83 (02/11/17 1507)  Resp: (!) 26 (02/11/17 1507)  BP: 118/68 (02/11/17 1315)  SpO2: 100 % (02/11/17 1507)  Weight: 65.3 kg (143 lb 15.4 oz) (02/11/17 1222)  Height: 5' 6" (167.6 cm) (02/09/17 1920)       Central Line  Date/Time: 2/11/2017 3:30 PM  Location procedure was performed: [facility] CRITICAL CARE MEDICINE  Performed by: ALHAJI NOONAN  Pre-operative Diagnosis: AMS and DONI   Post-operative diagnosis: AMS and DONI  Consent Done: Yes  Time out: Immediately prior to procedure a "time out" was called to verify the correct patient, procedure, equipment, support staff and site/side marked as required.  Indications: hemodialysis  Preparation: skin prepped with chlorhexidine (without alcohol)  Skin prep agent dried: skin prep agent completely dried prior to procedure  Sterile barriers: all five maximum sterile barriers used - cap, mask, sterile gown, sterile gloves, and large sterile sheet  Hand hygiene: hand hygiene performed prior to central venous catheter insertion  Location details: right internal jugular  Catheter type: triple lumen  Catheter size: 7 Fr  Catheter Length: 15cm    Ultrasound guidance: yes  Vessel Caliber: large, patent, compressibility normal  Vascular Doppler: not done  Needle advanced into vessel with real time Ultrasound guidance.  Guidewire confirmed in vessel.  Sterile sheath used.  Manometry: No   Number of attempts: 1  Assessment: placement verified by x-ray and no pneumothorax on x-ray  Complications: none  Estimated blood loss (mL): 15  Specimens: No  Implants: No  Post-procedure: line sutured,  chlorhexidine patch,  sterile dressing applied and blood return through all ports  Complications: No          Alhaji Noonan  2/11/2017  "

## 2017-02-11 NOTE — ASSESSMENT & PLAN NOTE
Sent for full septic work up >> blood, urine, resp Cxs   -- start broad zosyn and Vanc>> Hx of UTI on ABx 3 days ago.   -- will scan his abdomen if still elevated.   -- other DDx seizure or cancer>> will trend.

## 2017-02-11 NOTE — PROGRESS NOTES
Pt to [facility], updated CCS of pt not protecting airway very well, tongue falling back in throat, attempted to place oral airway,  Pt still too arousable at this time to tolerate oral airway, pt sitting up at 90 degree angle. , temp 97.6,  BP stable, O2 at 2L per NC, sating 99%. Pupils not equal, reactive to light, no corneal reflex, cough and gag noted. Noted abd breathing. Presents with shivering. Plans discussed with son and daughter, at bedside, by dr. figueredo about intubation and trialysis placement for dialysis. Will continue to monitor closely.

## 2017-02-11 NOTE — ASSESSMENT & PLAN NOTE
Followed by hepatology.   -- will trend labs for now.   -- consider US abdomen to evaluate for obstruction.

## 2017-02-12 PROBLEM — D68.9 COAGULOPATHY: Status: ACTIVE | Noted: 2017-01-01

## 2017-02-12 PROBLEM — R57.9 SHOCK: Status: ACTIVE | Noted: 2017-01-01

## 2017-02-12 PROBLEM — D65 DIC (DISSEMINATED INTRAVASCULAR COAGULATION): Status: ACTIVE | Noted: 2017-01-01

## 2017-02-12 PROBLEM — N17.0 ACUTE RENAL FAILURE WITH TUBULAR NECROSIS: Status: ACTIVE | Noted: 2017-01-01

## 2017-02-12 PROBLEM — G93.41 ACUTE METABOLIC ENCEPHALOPATHY: Status: ACTIVE | Noted: 2017-01-01

## 2017-02-12 NOTE — PROGRESS NOTES
Spoke with MD on call regarding status of A line.  Day shift indicated an A line would be placed this evening.  MD on call unaware - to check with team.  Awaiting further instruction.      Also notified MD of blood draining from patient's mouth.  Frequent suctioning required 2/2 blood backing up into ETT.  Asked MD about sending a random CBC.  Awaiting new orders.

## 2017-02-12 NOTE — PROGRESS NOTES
Progress Note  Nephrology    Admit Date: 2/9/2017   LOS: 3 days     SUBJECTIVE:     Follow-up For:  DONI     Pt spiked a fever overnight 100.8. No growth so far   Continues on CRRT, intubated with 50% FiO2 5 peep   Has persistent bleeding from site of trialysis cath     Review of Systems:  Unable to obtain pt intubated     OBJECTIVE:     Vital Signs (Most Recent)  Temp: 98.9 °F (37.2 °C) (02/12/17 1130)  Pulse: 103 (02/12/17 1230)  Resp: (!) 21 (02/12/17 1230)  BP: (!) 88/52 (02/12/17 1230)  SpO2: 96 % (02/12/17 1230)    Vital Signs Range (Last 24H):  Temp:  [97.5 °F (36.4 °C)-100.8 °F (38.2 °C)]   Pulse:  []   Resp:  [17-31]   BP: ()/(43-72)   SpO2:  [95 %-100 %]       Intake/Output Summary (Last 24 hours) at 02/12/17 1257  Last data filed at 02/12/17 1200   Gross per 24 hour   Intake          9333.12 ml   Output             4268 ml   Net          5065.12 ml     Physical Exam:  General: Well developed, well nourished   HEENT: Conjunctiva clear; ETT in place, trialysis cath with persistant bleeding   Neck: No JVD noted, Supple  CV- Normal S1, S2 with no murmurs,gallops,rubs  Resp- Lungs CTA Bilaterally, Unlabored  Abdomen- NTND, BS normoactive x4 quads, soft  Extrem- No cyanosis, clubbing, edema.  Skin- No rashes, lesions, ulcers  Neuro: sedated, intubated     Laboratory:  CBC:     Recent Labs  Lab 02/12/17  1152   WBC 8.96   RBC 1.66*   HGB 6.3*   HCT 17.8*   PLT 81*   *   MCH 38.0*   MCHC 35.4     CMP:     Recent Labs  Lab 02/12/17  0551   GLU 97  97   CALCIUM 8.1*  8.1*   ALBUMIN 3.9  3.9   PROT 6.1     138   K 4.1  4.1   CO2 21*  21*     107   BUN 10  10   CREATININE 1.3  1.3   ALKPHOS 109   ALT 12   AST 28   BILITOT 3.2*     ABGs:     Recent Labs  Lab 02/12/17  0755   PH 7.412   PCO2 35.5   HCO3 22.6*   POCSATURATED 70*   BE -2       Recent Labs  Lab 02/11/17  1216   COLORU Yellow   SPECGRAV 1.010   PHUR 6.0   PROTEINUA 1+*   BACTERIA Few*   NITRITE Negative   LEUKOCYTESUR  3+*   UROBILINOGEN Negative   HYALINECASTS 9*       Diagnostic Results:  Labs: Reviewed  X-Ray: Reviewed    ASSESSMENT/PLAN:     A 71 y.o. 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 Etiology pre- renal vs. Ischemic ATN vs HRS.     Trnasferred to ICU today for severe metabolic acidosis and AMS s/p intubation.      DONI with possible etiologies pre- renal vs. Ischemic ATN vs HRS  - baseline creat 0.8, slowly trended up and peaked at 6.3 this admission   UA with 1+ protein, 1+ occult blood, 3 + leukocytes   - pt oliguric, urine CX in process   - renal USG normal   Initiated CRRT 2/11 due to acute acidosis and AMS   - continuing CRRT at this time   - lactate still high, will keep UF at 200 at this time   - Hb 6.3 due to active bleeding 2/2 liver failure, please replace    - will continue to follow     Alejandra Robertson MD   Nephrology fellow   Pager [phone]

## 2017-02-12 NOTE — PROGRESS NOTES
[facility]  Critical Care Medicine  Progress Note    Patient Name: Danilo Jordan  MRN: [id number]  Admission Date: 2/9/2017  Hospital Length of Stay: 3 days  Code Status: Partial Code  Attending Provider: Cain Alvares*  Primary Care Provider: Yassine Kimball MD   Principal Problem: Encephalopathy, metabolic    Subjective:     HPI:  70 y/o PMH cirrhosis, HCC, HLD, HTN who presents after being sent from clinic for acute renal failure. He reports symptoms of decreased urine output, worsening LE edema for about 2-3 weeks since his last TACE 01/19/2017. States urine looks the same otherwise. Denies any dysuria, hesitancy, fevers, chills, sweats. Does report 5-6 loose BMs/day and states this has been going on for several weeks and is slightly improved since stopping nexavar of which he had known complications of diarrhea for about 1 month. He denies any abd pain, but does report distension and ascites though notes it has been stable. He is taking aldactone and lasix at home.     Of note lives in [facility] and reports he just finished an antibiotic course (they believe it was fosfomycin but are not completely sure) for bacteria discovered in his urine.     For his HCC oncologic history below and followed by Dr. Cordero in clinic. Not a Tx candidate given extent of disease.     Oncologic History (from Dr. Pink's 01/16/2017 clinic visit):   Per patient he has a long standing history of elevated liver function tests but he has been asymptomatic. Has hepatitis panel checked several times over the years including recently and was negative. On a recent imaging study in [facility] he was found to have liver lesions.  Had CT abdomen and pelvis done at [facility] 5/16/16 which showed at least 4 lesions.  Had Y-90 - right lobe of the liver.  Started on Nexavar 8/2016.  TACE 01/19/2017        Hospital/ICU Course:  2/10 He was admitted for DONI in a patient with HCC cirrhosis s/p TACE.  Also has chronic diarrhea. Will determine etiology of both, and manage accordingly.  2/11 rapid response called for AMS, stroke code also with negative CT for head bleed. Given 2 mg ativan for possible seizure by stroke team. Admitted to ICU for profound encephalopathy, acidosis for airway protection.     2/12 intubated, start dialysis for metabolic clearance. Overnight he start bleeding from mouth, IV lines, ETT, central line. With drop in PLT and fibrinogen.       Interval History/Significant Events:     Review of Systems   Unable to perform ROS: Mental status change     Objective:     Vital Signs (Most Recent):  Temp: 99.1 °F (37.3 °C) (02/12/17 0715)  Pulse: 102 (02/12/17 0747)  Resp: 20 (02/12/17 0730)  BP: (!) 103/55 (02/12/17 0730)  SpO2: 99 % (02/12/17 0747) Vital Signs (24h Range):  Temp:  [96.5 °F (35.8 °C)-100.8 °F (38.2 °C)] 99.1 °F (37.3 °C)  Pulse:  [] 102  Resp:  [12-31] 20  SpO2:  [95 %-100 %] 99 %  BP: ()/(43-95) 103/55   Weight: 66.4 kg (146 lb 6.2 oz)  Body mass index is 23.63 kg/(m^2).      Intake/Output Summary (Last 24 hours) at 02/12/17 0755  Last data filed at 02/12/17 0752   Gross per 24 hour   Intake          7673.46 ml   Output             3487 ml   Net          4186.46 ml       Physical Exam   Constitutional: He appears well-developed. No distress (resp).   Active bleed from central line, peripheral line, ETT.     HENT:   Mouth/Throat: No oropharyngeal exudate.   Eyes: Pupils are equal, round, and reactive to light. No scleral icterus.   Neck: Normal range of motion. No JVD present. No tracheal deviation present.   Cardiovascular: Normal rate and regular rhythm.    No murmur heard.  Pulmonary/Chest: Breath sounds normal. No stridor. He is in respiratory distress. He has no wheezes. He has no rales.   Abdominal: Soft. He exhibits distension. There is no tenderness.   Musculoskeletal: He exhibits edema. He exhibits no deformity.   Neurological: He is unresponsive. He displays no  atrophy. No cranial nerve deficit. He exhibits normal muscle tone. He displays no seizure activity. GCS eye subscore is 2. GCS verbal subscore is 1. GCS motor subscore is 1.   Skin: No rash noted.   Vitals reviewed.      Vents:  Vent Mode: A/C (02/12/17 0747)  Ventilator Initiated: Yes (02/11/17 1231)  Set Rate: 20 bmp (02/12/17 0747)  Vt Set: 450 mL (02/12/17 0747)  Pressure Support: 0 cmH20 (02/12/17 0747)  PEEP/CPAP: 5 cmH20 (02/12/17 0747)  Oxygen Concentration (%): 50 (02/12/17 0747)  Peak Airway Pressure: 49 cmH2O (02/12/17 0747)  Total Ve: 11.9 mL (02/12/17 0747)  F/VT Ratio<105 (RSBI): (!) 42.59 (02/12/17 0505)  Lines/Drains/Airways     Central Venous Catheter Line                 Trialysis (Dialysis) Catheter 02/11/17 1320 right internal jugular less than 1 day          Drain                 NG/OG Tube 02/11/17 1300 nasogastric less than 1 day         Urethral Catheter 02/11/17 1309 16 Fr. less than 1 day          Airway                 Airway - Non-Surgical 02/11/17 1240 Endotracheal Tube less than 1 day          Peripheral Intravenous Line                 Peripheral IV - Single Lumen 02/10/17 0700 Left Upper Arm 2 days         Peripheral IV - Single Lumen 02/11/17 Left Forearm 1 day              Significant Labs:    CBC/Anemia Profile:    Recent Labs  Lab 02/11/17 1758 02/12/17  0055 02/12/17  0551   WBC 3.20* 7.45 8.41   HGB 8.9* 8.2* 7.4*   HCT 25.1* 22.5* 20.5*   PLT 33* 42* 126*   * 102* 104*   RDW 14.8* 14.8* 15.3*        Chemistries:    Recent Labs  Lab 02/11/17  0439 02/11/17  1214 02/11/17  1557 02/11/17  1758 02/12/17  0018 02/12/17  0551    135*  --  139  --  138  138   K 4.1 4.4  --  4.0  --  4.1  4.1   * 113*  --  113*  --  107  107   CO2 7* 8*  --  16*  --  21*  21*   BUN 84* 90*  --  52*  --  10  10   CREATININE 5.7* 6.0*  --  3.4*  --  1.3  1.3   CALCIUM 8.5* 9.1  --  8.1*  --  8.1*  8.1*   ALBUMIN 3.7  --   --  3.5  --  3.9  3.9   PROT 6.7  --   --   --   --   6.1   BILITOT 2.9*  --   --   --   --  3.2*   ALKPHOS 171*  --   --   --   --  109   ALT 17  --   --   --   --  12   AST 26  --   --   --   --  28   MG 2.3  --  2.0  --  1.7 2.3   PHOS 5.7*  --   --  3.6  --  1.6*  1.6*       All pertinent labs within the past 24 hours have been reviewed.        Assessment/Plan:     Neuro  * Encephalopathy, metabolic  CT brain negative for bleed 2/11, with no localization on exam. intubated for airway protection   DDx include hyperuremia vs hepatic encephalopathy vs seizure vs ischemic stroke  -- will treat as hepatic coma>> ammonia 216 on 2/11>> place NG tube and start lactulose>> no BM in the last 24 hours.    -- called nephrology, on CRRT for dialysis for metabolic clearance, keep MAP above 70  -- EEG spot show no seizure activity.   -- will consider MRI for ischemic stroke.        Cardiac  Essential hypertension  Hold home antihypertensive for now after intubation.     Renal  Acute renal failure  minimal UOP since last night.   Acute decline, treated as HRS in the floor, will continue albumin, midodrine and octreotide>> appreciate nephrology help and response .   -- started dialysis for metabolic ( sever acidosis and clinical hyperuremia )   -- acidosis improving.       Metabolic acidosis, normal anion gap (NAG)  Likely related to DONI>> improving with CRRT.       Elevated lactic acid level  Sent for full septic work up >> blood, urine, resp Cxs negative to date    -- start broad zosyn and Vanc>> Hx of UTI on ABx 3 days ago.          Hem/Onc  HCC (hepatocellular carcinoma)  Followed by hepatology.   -- will trend labs for now.   -- consider US abdomen to evaluate for obstruction.    Coagulopathy  Likely related to liver failure, other DDx is DIC   -- trend fibro, PLT,INR and replace PRN.   -- continue Abx, US doppler show ascites with no thrombosis.      ESTIVEN Jefferson  Critical Care Medicine  [facility]

## 2017-02-12 NOTE — PLAN OF CARE
Problem: Patient Care Overview  Goal: Plan of Care Review  Outcome: Ongoing (interventions implemented as appropriate)  Intubated/sedated.  Some withdrawal to pain/deep suctioning.  Right IJ trialysis intact - CRRT running with a UF of 200.  Site oozed all night - reinforced with multiple dressings.  Levophed ran at 0.31mcg for most of the night.  MAP >70.  Fentanyl infused at 150 mcg/hr.  Melendez intact draining small amount of urine (as patient is on CRRT).  Patient received a total of 3 units of FFP and 2 units of plasma.  Generalized edema.  NGT intact.  280cc of residual noted overnight. T max 100.8.  Daughter @ bedside all night.   visited around 2200 per family's request for [facility].  Turned q 2 hours with pillows.  No acute events overnight aside from bleeding to mouth/ ETT.      Problem: Ventilation, Mechanical Invasive (Adult)  Goal: Signs and Symptoms of Listed Potential Problems Will be Absent, Minimized or Managed (Ventilation, Mechanical Invasive)  Signs and symptoms of listed potential problems will be absent, minimized or managed by discharge/transition of care (reference Ventilation, Mechanical Invasive (Adult) CPG).   Outcome: Ongoing (interventions implemented as appropriate)  Patient remained on PEEP 5, FiO2 50% all shift.  ETT measures 24 @ the lips.  Frequent suctioning 2/2 bleeding from mouth/ETT.  300cc drained into suction cannister of juarez red blood.

## 2017-02-12 NOTE — ASSESSMENT & PLAN NOTE
minimal UOP since last night.   Acute decline, treated as HRS in the floor, will continue albumin, midodrine and octreotide>> appreciate nephrology help and response .   -- started dialysis for metabolic ( sever acidosis and clinical hyperuremia )   -- acidosis improving.

## 2017-02-12 NOTE — ASSESSMENT & PLAN NOTE
CT brain negative for bleed 2/11, with no localization on exam. intubated for airway protection   DDx include hyperuremia vs hepatic encephalopathy vs seizure vs ischemic stroke  -- will treat as hepatic coma>> ammonia 216 on 2/11>> place NG tube and start lactulose>> no BM in the last 24 hours.    -- called nephrology, on CRRT for dialysis for metabolic clearance, keep MAP above 70  -- EEG spot show no seizure activity.   -- will consider MRI for ischemic stroke.

## 2017-02-12 NOTE — ASSESSMENT & PLAN NOTE
Likely related to liver failure, other DDx is DIC   -- trend fibro, PLT,INR and replace PRN.   -- continue Abx, US doppler show ascites with no thrombosis.

## 2017-02-12 NOTE — SUBJECTIVE & OBJECTIVE
Interval History/Significant Events:     Review of Systems   Unable to perform ROS: Mental status change     Objective:     Vital Signs (Most Recent):  Temp: 99.1 °F (37.3 °C) (02/12/17 0715)  Pulse: 102 (02/12/17 0747)  Resp: 20 (02/12/17 0730)  BP: (!) 103/55 (02/12/17 0730)  SpO2: 99 % (02/12/17 0747) Vital Signs (24h Range):  Temp:  [96.5 °F (35.8 °C)-100.8 °F (38.2 °C)] 99.1 °F (37.3 °C)  Pulse:  [] 102  Resp:  [12-31] 20  SpO2:  [95 %-100 %] 99 %  BP: ()/(43-95) 103/55   Weight: 66.4 kg (146 lb 6.2 oz)  Body mass index is 23.63 kg/(m^2).      Intake/Output Summary (Last 24 hours) at 02/12/17 0755  Last data filed at 02/12/17 0752   Gross per 24 hour   Intake          7673.46 ml   Output             3487 ml   Net          4186.46 ml       Physical Exam   Constitutional: He appears well-developed. No distress (resp).   Active bleed from central line, peripheral line, ETT.     HENT:   Mouth/Throat: No oropharyngeal exudate.   Eyes: Pupils are equal, round, and reactive to light. No scleral icterus.   Neck: Normal range of motion. No JVD present. No tracheal deviation present.   Cardiovascular: Normal rate and regular rhythm.    No murmur heard.  Pulmonary/Chest: Breath sounds normal. No stridor. He is in respiratory distress. He has no wheezes. He has no rales.   Abdominal: Soft. He exhibits distension. There is no tenderness.   Musculoskeletal: He exhibits edema. He exhibits no deformity.   Neurological: He is unresponsive. He displays no atrophy. No cranial nerve deficit. He exhibits normal muscle tone. He displays no seizure activity. GCS eye subscore is 2. GCS verbal subscore is 1. GCS motor subscore is 1.   Skin: No rash noted.   Vitals reviewed.      Vents:  Vent Mode: A/C (02/12/17 0747)  Ventilator Initiated: Yes (02/11/17 1231)  Set Rate: 20 bmp (02/12/17 0747)  Vt Set: 450 mL (02/12/17 0747)  Pressure Support: 0 cmH20 (02/12/17 0747)  PEEP/CPAP: 5 cmH20 (02/12/17 0747)  Oxygen Concentration  (%): 50 (02/12/17 0747)  Peak Airway Pressure: 49 cmH2O (02/12/17 0747)  Total Ve: 11.9 mL (02/12/17 0747)  F/VT Ratio<105 (RSBI): (!) 42.59 (02/12/17 0505)  Lines/Drains/Airways     Central Venous Catheter Line                 Trialysis (Dialysis) Catheter 02/11/17 1320 right internal jugular less than 1 day          Drain                 NG/OG Tube 02/11/17 1300 nasogastric less than 1 day         Urethral Catheter 02/11/17 1309 16 Fr. less than 1 day          Airway                 Airway - Non-Surgical 02/11/17 1240 Endotracheal Tube less than 1 day          Peripheral Intravenous Line                 Peripheral IV - Single Lumen 02/10/17 0700 Left Upper Arm 2 days         Peripheral IV - Single Lumen 02/11/17 Left Forearm 1 day              Significant Labs:    CBC/Anemia Profile:    Recent Labs  Lab 02/11/17  1758 02/12/17  0055 02/12/17  0551   WBC 3.20* 7.45 8.41   HGB 8.9* 8.2* 7.4*   HCT 25.1* 22.5* 20.5*   PLT 33* 42* 126*   * 102* 104*   RDW 14.8* 14.8* 15.3*        Chemistries:    Recent Labs  Lab 02/11/17  0439 02/11/17  1214 02/11/17  1557 02/11/17  1758 02/12/17  0018 02/12/17  0551    135*  --  139  --  138  138   K 4.1 4.4  --  4.0  --  4.1  4.1   * 113*  --  113*  --  107  107   CO2 7* 8*  --  16*  --  21*  21*   BUN 84* 90*  --  52*  --  10  10   CREATININE 5.7* 6.0*  --  3.4*  --  1.3  1.3   CALCIUM 8.5* 9.1  --  8.1*  --  8.1*  8.1*   ALBUMIN 3.7  --   --  3.5  --  3.9  3.9   PROT 6.7  --   --   --   --  6.1   BILITOT 2.9*  --   --   --   --  3.2*   ALKPHOS 171*  --   --   --   --  109   ALT 17  --   --   --   --  12   AST 26  --   --   --   --  28   MG 2.3  --  2.0  --  1.7 2.3   PHOS 5.7*  --   --  3.6  --  1.6*  1.6*       All pertinent labs within the past 24 hours have been reviewed.

## 2017-02-12 NOTE — ASSESSMENT & PLAN NOTE
Sent for full septic work up >> blood, urine, resp Cxs negative to date    -- start broad zosyn and Vanc>> Hx of UTI on ABx 3 days ago.

## 2017-02-13 NOTE — PROGRESS NOTES
CCS  present at bedside. Talked to daughter. Will give blood products as needed until family can get in touch with mother who is in [place] (patients wife).

## 2017-02-13 NOTE — DISCHARGE SUMMARY
[facility]  Critical Care Medicine  Discharge Summary      Patient Name: Danilo Jordan  MRN: [id number]  Admission Date: 2/9/2017  Hospital Length of Stay: 4 days  Discharge Date and Time:  02/13/2017 4:56 PM  Attending Physician: Jose Calloway MD   Discharging Provider: Makenzie Cornejo MD  Primary Care Provider: Yassine Kimball MD  Reason for Admission: ESLD 2/2 HCC with recent onset renal failure    HPI:   Per admission H&P:  70 y/o PMH cirrhosis, HCC, HLD, HTN who presents after being sent from clinic for acute renal failure. He reports symptoms of decreased urine output, worsening LE edema for about 2-3 weeks since his last TACE 01/19/2017. States urine looks the same otherwise. Denies any dysuria, hesitancy, fevers, chills, sweats. Does report 5-6 loose BMs/day and states this has been going on for several weeks and is slightly improved since stopping nexavar of which he had known complications of diarrhea for about 1 month. He denies any abd pain, but does report distension and ascites though notes it has been stable. He is taking aldactone and lasix at home.     Of note lives in [facility] and reports he just finished an antibiotic course (they believe it was fosfomycin but are not completely sure) for bacteria discovered in his urine.     For his HCC oncologic history below and followed by Dr. Cordero in clinic. Not a Tx candidate given extent of disease.     Oncologic History (from Dr. Pink's 01/16/2017 clinic visit):   Per patient he has a long standing history of elevated liver function tests but he has been asymptomatic. Has hepatitis panel checked several times over the years including recently and was negative. On a recent imaging study in [facility] he was found to have liver lesions.  Had CT abdomen and pelvis done at [facility] 5/16/16 which showed at least 4 lesions.  Had Y-90 - right lobe of the liver.  Started on Nexavar 8/2016.  TACE 01/19/2017        * No  surgery found *    Indwelling Lines/Drains at Time of Discharge:   Lines/Drains/Airways     Central Venous Catheter Line                 Trialysis (Dialysis) Catheter 02/11/17 1320 right internal jugular 2 days          Drain                 NG/OG Tube 02/11/17 1300 nasogastric 2 days         Urethral Catheter 02/11/17 1309 16 Fr. 2 days              Hospital Course:   2/10 He was admitted for DONI in a patient with HCC cirrhosis s/p TACE. Also has chronic diarrhea. Will determine etiology of both, and manage accordingly.  2/11 rapid response called for AMS, stroke code also with negative CT for head bleed. Given 2 mg ativan for possible seizure by stroke team. Admitted to ICU for profound encephalopathy, acidosis for airway protection.     2/12 intubated, start dialysis for metabolic clearance. Overnight he start bleeding from mouth, IV lines, ETT, central line. With drop in PLT and fibrinogen.   2/13 family discussions over past 48 hrs.  Family decision to withdraw care.  Patient passed at 16:11      Consults         Status Ordering Provider     Inpatient consult to Critical Care Medicine  Once     Provider:  (Not yet assigned)    Completed XOCHITL JOHNSTON     Inpatient consult to Hepatology  Once     Provider:  (Not yet assigned)    Final result DEWEY LINARES     Inpatient consult to Nephrology  Once     Provider:  (Not yet assigned)    Final result DEWEY LINARES     Inpatient consult to Spiritual Care  Once     Provider:  (Not yet assigned)    Acknowledged ITA GOMES        Significant Labs:    Recent Labs  Lab 02/13/17  0449   CALCIUM 8.1*  8.1*   PROT 5.5*     138   K 4.0  4.0   CO2 24  24     108   BUN 2*  2*   CREATININE 0.7  0.7   ALKPHOS 77   ALT 16   AST 43*   BILITOT 4.9*       Recent Labs  Lab 02/13/17  0232   WBC 7.66   RBC 2.40*   HGB 7.8*   HCT 22.6*   PLT 33*   MCV 94   MCH 32.5*   MCHC 34.5       Recent Labs  Lab 02/13/17  0829   INR 1.5*       Recent  Labs  Lab 02/13/17  0405   PH 7.390   PCO2 42.2   PO2 69*   HCO3 25.5   POCSATURATED 93*   BE 1     Significant Imaging:  Imaging Results         X-Ray Chest 1 View (Final result) Result time:  02/12/17 11:22:33    Unchanged satisfactory positioning of support devices.      Narrative:    FINDINGS:  Support devices: Endotracheal tube tip is at the level of the lower thoracic trachea.  Right internal jugular central venous catheter tip overlies the SVC.  Enteric tube tip overlies the region of the gastric antrum/duodenal bulb.  Vascular occlusion coils are partially imaged in the right upper quadrant.  Chest: Cardiomediastinal silhouette is normal.  Lungs are hypoinflated.  There is subsegmental right basilar atelectasis.  No focal airspace opacity.  No pleural effusion or pneumothorax.  Upper Abdomen: Normal.  Other: N/A.    US Abdomen Limited with Doppler (xpd) (Final result) Result time:  02/11/17 22:51:10    Cirrhotic liver with 3 right hepatic masses. Left lobe is not well-visualized on the current study.  Please see CT 11/30/2016 report for more detailed evaluation of known hepatic masses.  Splenomegaly.  Large volume ascites.  Biliary sludge with wall thickening, likely on the basis of underlying hepatic disease, if there is concern for acute cholecystitis, HIDA scan should be performed..      Narrative:    FINDINGS:     LIVER:  The liver is small in size, measuring 11.7 cm and nodular in contour compatible with cirrhosis.  The left hepatic lobe is not well-visualized and therefore the patient's known mass in the left hepatic lobe is unable to be evaluated on the current exam. The right hepatic lobe demonstrates an echogenic mass measuring 5.2 x 4.6 x 5.1 cm and 2 predominately hypoechoic masses measuring 2.2 x 2.2 x 2.5 cm and 3.6 x 2.3 x 2.7 cm.  There is large volume of ascites.   GALLBLADDER:  There is biliary sludge within the gallbladder. There is gallbladder wall thickening up to 0.4 cm likely related to  patient's underlying liver disease.  There is no intra- or extrahepatic biliary ductal dilatation. The common bile duct measures 0.4 cm.  sonographic Berumen's unable to be assessed secondary to patient condition.  VASCULAR:  PORTAL VEINS: Main, right, and left portal veins are patent with appropriate direction of flow.  HEPATIC VEINS: Right, middle, and left hepatic veins are patent with appropriate direction of flow.  IVC: Patent with appropriate direction of flow.  MAIN HEPATIC ARTERY:  Resistive index is 0.89. Peak systolic velocity is 197cm/sec. Waveforms are normal.  RECANALIZED UMBILICAL VEIN: No.  COLLATERALS: None.  SPLEEN: Mildly enlarged measuring 13.2 x 5.2 cm  PANCREAS: Not visualized due to bowel gas.    X-Ray Chest 1 View (Final result) Result time:  02/11/17 15:13:55    Central line terminates over the cavoatrial junction. No evidence of complication.    Narrative:    Findings:   Central line terminates over the cavoatrial junction. Remaining support devices and appearance of the chest are unchanged. The cardiac silhouette isunchanged in size.  There are no acute bony abnormalities.    X-Ray Abdomen AP 1 View (Final result) Result time:  02/11/17 11:08:53    Findings:   The bowel gas pattern is non-specific.Nasogastric tube present tip of which is level the distal stomach.  No airspace consolidation at the lung bases.No acute bony abnormality.No abnormal soft tissue masses.No abnormal calcific densities.    X-Ray Chest 1 View (Final result) Result time:  02/11/17 09:41:49    Low lung volumes. No focal consolidation    Narrative:    Findings:   Enteric tube terminates over the gastric outlet.Lung lungs are low. No focal consolidations. The cardiac silhouette isnormal in size.  There are no acute bony abnormalities.    CT Head Without Contrast (Final result) Result time:  02/11/17 09:21:20    No acute hemorrhage or infarct.  Rounded peripherally calcified soft tissue focus within the left sylvian  fissure, questionably enlarged from 10/19/2016.  While this could represent a lobulated portion of cortex, the appearance raises concern for MCA distribution aneurysm with slight increase in size. Consider CTA or MRA for better characterization.  Age-appropriate generalized cerebral volume loss.    Narrative:    FINDINGS:  Brain and intracranial structures: There is no mass lesion, hemorrhage, or acute infarct. Gray-white differentiation is preserved. There is generalized cerebral volume loss, appropriate for age.  No hydrocephalus.  Mild periventricular white matter hypoattenuation likely relates to chronic microvascular ischemic disease.   A focal rounded soft tissue density within the left sylvian fissure with small focus of calcification peripherally has questionably increased in size to 0.9  X 0.8 cm on axial imaging from 0.6 x 0.5 cm on 10/19/2016.   Skull:  Normal.  Scalp: Normal.  Orbits and face (included portions): Normal.  Paranasal sinuses and mastoid air cells (included portions): Normal.    US Retroperitoneal Complete (Final result) Result time:  02/10/17 02:48:26    Unremarkable kidneys.  Large volume ascites.    Narrative:    FINDINGS:   The right kidney measures 10.2cm. The left kidney measures 9.5cm. Cortical echogenicity is within normal limits.  Corticomedullary distinction is maintained.  There is no cortical thinning present. Perfusion of the right kidney is within normal limits and perfusion of the left kidney is within normal limits. The resistive index of the right kidney is 0.62. The resistive index of the left kidney is 0.76.  The splenic resistive index measures 0.75.  There is no hydronephrosis, nephrolithiasis or solid renal masses.  The bladder is partially distended and cannot be completely assessed.  The prostate was not visualized.  Incidental visualization of large volume ascites.    X-Ray Chest AP Portable (Final result) Result time:  02/09/17 23:06:42    No detrimental change or  radiographic acute intrathoracic process seen on this single view. Followup as warranted.    Narrative:    FINDINGS: AP portable view of the chest. Monitoring leads overlie the chest. No detrimental change. Small nodular density projected over the right mid to upper lung zone which may correspond to previously noted subcentimeter right upper lobe pulmonary nodule. Otherwise, the lungs are somewhat hypoinflated without large consolidation or new focal opacity. Right basilar platelike scarring versus atelectasis. No large pleural effusion or pneumothorax. Cardiomediastinal silhouette is within normal limits. Calcific plaque at the level of the arch. No acute osseous process seen. PA and lateral views can be obtained.       Pending Diagnostic Studies:     Procedure Component Value Units Date/Time    Lactic acid, plasma [[id number]] Collected:  02/10/17 0025    Order Status:  Sent Lab Status:  In process Updated:  02/10/17 0026    Specimen:  Blood from Blood         Final Active Diagnoses:    Diagnosis Date Noted POA    PRINCIPAL PROBLEM:  Encephalopathy, metabolic [G93.41] 02/11/2017 No    DIC (disseminated intravascular coagulation) [D65] 02/12/2017 No    Shock [R57.9] 02/12/2017 No    DONI (acute kidney injury) [N17.9] 02/11/2017 Yes    CKD (chronic kidney disease) stage 3, GFR 30-59 ml/min [N18.3] 02/11/2017 Yes    Acute metabolic encephalopathy [G93.41] 02/11/2017 No    Acute hypoxemic respiratory failure [J96.01] 02/11/2017 No    Elevated lactic acid level [E87.2] 02/11/2017 No    Cirrhosis of liver with ascites [K74.60] 02/10/2017 Yes    Chronic diarrhea [K52.9] 02/10/2017 Yes    Metabolic acidosis, normal anion gap (NAG) [E87.2] 02/10/2017 Yes    Hypotension [I95.9] 02/10/2017 Yes    Renal failure [N19] 02/09/2017 Yes    Acute renal failure [N17.9] 01/16/2017 Yes    HCC (hepatocellular carcinoma) [C22.0] 05/17/2016 Yes    Essential hypertension [I10] 05/16/2016 Yes      Problems Resolved During  this Admission:    Diagnosis Date Noted Date Resolved POA     Neuro  * Encephalopathy, metabolic  CT brain negative for bleed , with no localization on exam. intubated for airway protection   DDx include hyperuremia vs hepatic encephalopathy vs seizure vs ischemic stroke  -- will treat as hepatic coma>> ammonia 216 on >> place NG tube and start lactulose>> no BM in the last 24 hours.    -- called nephrology, on CRRT for dialysis for metabolic clearance, keep MAP above 70  -- EEG spot show no seizure activity.   -- will consider MRI for ischemic stroke.        Pulmonary  Acute hypoxemic respiratory failure  Intubated for airway protection     Cardiac  Essential hypertension  Hold home antihypertensive for now after intubation.     Renal  Acute renal failure  minimal UOP since last night.   Acute decline, treated as HRS in the floor, will continue albumin, midodrine and octreotide>> appreciate nephrology help and response .   -- started dialysis for metabolic ( sever acidosis and clinical hyperuremia )   -- acidosis improving.       Metabolic acidosis, normal anion gap (NAG)  Likely related to DONI>> improving with CRRT.     Elevated lactic acid level  Sent for full septic work up >> blood, urine, resp Cxs negative to date    -- start broad zosyn and Vanc>> Hx of UTI on ABx 3 days ago.        Hem/Onc  HCC (hepatocellular carcinoma)  Followed by hepatology.   -- will trend labs for now.   -- consider US abdomen to evaluate for obstruction.    DIC (disseminated intravascular coagulation)  Likely related to liver failure, other DDx is DIC   -- trend fibro, PLT,INR and replace PRN.   -- continue Abx, US doppler show ascites with no thrombosis.     Discharged Condition:     Disposition: .    Patient Instructions:   No discharge procedures on file.  Medications:  None    Makenzie Cornejo MD  Critical Care Medicine  [facility]

## 2017-02-13 NOTE — PT/OT/SLP PROGRESS
Physical Therapy      AvelTRAVIS Carbonelos  MRN: [id number]    Patient not seen today secondary to  (pt failed MOVE screen.). Will follow-up as appropriate.    Judy Gibbs, PT

## 2017-02-13 NOTE — PROGRESS NOTES
Plan for withdrawal of care.   We will sign off.     Viky Phelps MD PGY-6  Gastroenterology Fellow  Pager# [phone]

## 2017-02-13 NOTE — PLAN OF CARE
Problem: Patient Care Overview  Goal: Plan of Care Review  Outcome: Ongoing (interventions implemented as appropriate)  Patient remains intubated and sedated with bleeding from rt IJ dialysis access and from ETT when suctioning. Dressing changes to Rt IJ performed as needed and monitoring serial labs. Continues to receive blood products during the night. B/P remains stable on Levo @ 0.64mcq/kg/min. No urine output over the night and no BM. Daughter and Son at bedside and aware of grave condition of patient. Plan of care for the night went over with family and questions answered and conform given.

## 2017-02-13 NOTE — PROGRESS NOTES
Notified CCS  that patient continues to bleed large amounts from rt IJ Dialysis access. Dressing has been changed multiple times, direct pressure has been held for 15 mins, and surgicel has been applied to site. CBC sent down awaiting resolves. Daughter present at bedside. No new orders given.

## 2017-02-13 NOTE — PROCEDURES
DATE OF SERVICE:  02/11/2017.    ELECTROENCEPHALOGRAM REPORT    METHODOLOGY:  Electroencephalographic (EEG) recording is recorded with   electrodes placed according to the International 10-20 placement system.  Thirty   two (32) channels of digital signal (sampling rate of 512/sec), including T1   and T2, were simultaneously recorded from the scalp and may include EKG, EMG,   and/or eye monitors.  Recording band pass was 0.1 to 512 Hz.  Digital video   recording of the patient is simultaneously recorded with the EEG.  The patient   is instructed to report clinical symptoms which may occur during the recording   session.  EEG and video recording are stored and archived in digital format.    Activation procedures, which include photic stimulation, hyperventilation and   instructing patients to perform simple tasks, are done in selected patients.    The EEG is displayed on a monitor screen and can be reviewed using different   montages.  Computer assisted-analysis is employed to detect spike and   electrographic seizure activity.  The entire record is submitted for computer   analysis.  The entire recording is visually reviewed, and the times identified   by computer analysis as being spikes or seizures are reviewed again.    Compressed spectral analysis (CSA) is also performed on the activity recorded   from each individual channel.  This is displayed as a power display of   frequencies from 0 to 30 Hz over time.  The CSA is reviewed looking for   asymmetries in power between homologous areas of the scalp, then compared with   the original EEG recording.    [employer] software was also utilized in the review of this study.  This software   suite analyzes the EEG recording in multiple domains.  Coherence and rhythmicity   are computed to identify EEG sections which may contain organized seizures.    Each channel undergoes analysis to detect the presence of spike and sharp waves   which have special and morphological  characteristics of epileptic activity.  The   routine EEG recording is converted from special into frequency domain.  This is   then displayed comparing homologous areas to identify areas of significant   asymmetry.  Algorithm to identify non-cortically generated artifact is used to   separate artifact from the EEG.    This is a routine in-patient EEG.    EEG FINDINGS:  The background of the study contains low amplitude delta activity   in the 2 to 4 Hz range, primarily at times it is semi-rhythmic, and at times   theta frequencies in the 4-6 Hz range are appreciated as well.  There is fair   variability and fluctuation, but no evolution in this record, which lasts 22   minutes.  There are no normal waking or sleeping rhythms.  There is   frontotemporal muscle artifact.  They are not many overlying faster frequencies   seen.    There are no epileptiform discharges, no electrographic seizures.  No clinical   events, no focal or hemispheric findings.  EKG channel contains significant   artifact.    INTERPRETATION:  Abnormal inpatient EEG due to moderate diffuse slowing and   disorganization of the background rhythms.  No focal features or evidence of   seizures were seen.      SUBHASH/MICHEL  dd: 02/12/2017 11:26:01 (CST)  td: 02/12/2017 20:45:26 (CST)  Doc ID   #[id number]  Job ID #[id number]    CC:

## 2017-02-13 NOTE — PROGRESS NOTES
Changed dressing to Rt IJ and bloody linen. After patient was cleaned up patient started moving arms, opening eyes and trying to sit up. Daughter at bedside and very upset. Increased fentanyl  infusion and bolus given. Patient continues to become increasingly agitated and daughter is becoming more upset.  0640 [facility] called. Propofol infusion started. 0443 CCS present at bedside and talking to daughter. 0715 Patient now calm and resting.

## 2017-02-13 NOTE — PROGRESS NOTES
Subjective:       Patient ID: Danilo Jordan is a 71 y.o. male.    Chief Complaint: Follow-up    Behzad Tapia is here for follow up HCC. He came in before his scheduled follow up next month with Hepatology as he began to experience deterioration in his overall health. He is s/p TACE procedures to address his HCC with the last one being about 5 weeks ago. He recently began to notice increased fatigue, ascites. Was seen by his local doctor who obtained lab work which he brings with his, These show worsening renal function and deteriorating liver function as well. His albumin level was low contributing to his ascites. He has been taking his BP meds as well as Lasix and Aldactone with minimal response. He was also started on Fosfomycin for supposed UTI, but there is no clear evidence of this except for pyuria, some bacteriuria, but significant numbers of epithelial cells indicating a possible contamination upon collecting the sample. I had obaitned lab work prior to our visit which confirmed worsening renal function and other abnormalities. My exam also revealed low BP and I made arrangemnts for him to be sent to ER for further evaluation.  Review of Systems   Constitutional: Positive for activity change, appetite change, fatigue and unexpected weight change.   Respiratory: Positive for shortness of breath. Negative for cough.    Cardiovascular: Positive for leg swelling.   Gastrointestinal: Positive for abdominal distention and diarrhea. Negative for abdominal pain and blood in stool.   Genitourinary: Positive for decreased urine volume and difficulty urinating. Negative for hematuria.   Neurological: Positive for dizziness, weakness and light-headedness. Negative for headaches.   Hematological: Bruises/bleeds easily.       Objective:      Physical Exam   Constitutional: He is oriented to person, place, and time.   Chronically ill-appearing gentleman; cachectic.   Cardiovascular: Normal rate, regular rhythm,  normal heart sounds and intact distal pulses.    No murmur heard.  Pulmonary/Chest: Effort normal and breath sounds normal. No respiratory distress. He has no wheezes.   Abdominal: Soft. Bowel sounds are normal. He exhibits distension. He exhibits no mass. There is no tenderness.   Ascites.   Musculoskeletal: He exhibits edema. He exhibits no tenderness.   Neurological: He is alert and oriented to person, place, and time.   Skin: Skin is warm and dry.   Jaundiced.   Psychiatric: He has a normal mood and affect. His behavior is normal. Thought content normal.   Nursing note and vitals reviewed.      Assessment:       1. HCC (hepatocellular carcinoma)    2. Abnormal urine findings    3. Hypotension, unspecified hypotension type     4.     Worsening renal function.  Plan:    1. Admit to ER.

## 2017-02-13 NOTE — PLAN OF CARE
Patient transitioning to comfort measures with withdrawal of advanced life support.       02/13/17 1105   Discharge Assessment   Assessment Type Discharge Planning Assessment   Confirmed/corrected address and phone number on facesheet? No   Caroline Hudson RN, BSN  Case Management  [facility]  Ext. [phone]

## 2017-02-13 NOTE — PROGRESS NOTES
1035: Dr. Love, Dr. Silva, pt's son (Danilo), pt's daughter (Devendra) at bedside.  Pt's wife was called using [employer] and [place]  present on phone for interpretation.  Following conversation wife, and children decided that it would be honoring the pt's wishes to transition to comfort care and remove life sustaining machines.  Morphine and ativan administered.  Morphine gtt ordered and will titrate down propofol to off as morphine gtt is started.      ~1130: Levophed turned off.  Family at bedside.

## 2017-02-13 NOTE — PROGRESS NOTES
Pt seen on CRRT today.   D/w family and primary team.   Decision taken to withdraw care. Will sign off.     Alejandra Robertson MD   Nephrology fellow   Pager [phone]

## 2017-02-13 NOTE — PHYSICIAN QUERY
"PT Name: Danilo Jordan  MR #: [id number]  Physician Query Form - Hematology Clarification    Reviewer : Asha Delgado RN, CDI   Ext [phone]    This form is a permanent document in the medical record.      Query Date: February 13, 2017    By submitting this query, we are merely seeking further clarification of documentation. Please utilize your independent clinical judgment when addressing the question(s) below.    (The Medical record reflects the following:)     Indicators    Supporting Clinical Findings Location in Medical Record    "Anemia" documented     x H & H = Hgb = 9.9 -> 8.9->  6.3  Hct = 28.3-> 25.1-> 17.8   Labs 2/10, 2/11, 2/12    BP =      HR =      "GI bleeding" documented     x Acute bleeding (Non GI site) Hb 6.3 due to active bleeding 2/2 liver failure, please replace    Progress note 2/12/17  Nephrology    x Transfusion(s) 2 units PRBC   1 unit   PRBC  flowsheet 2/12  flowsheet 2/13      Treatment:      Other:        Provider, please specify diagnosis or diagnoses associated with above clinical findings.    [  ] Acute blood loss anemia expected post-operatively     [  x ] Acute blood loss anemia    [  ] Aplastic anemia    [  ] Iron deficiency anemia     [  ] Pernicious anemia     [  ] Precipitous drop in H&H    [  ] Anemia of chronic disease ( Specify chronic disease)      [  ] CKD (specify stage) ___________________________     [  ] Neoplastic disease      [  ] Due to Chemotherapy      [  ] Other (Specify) _______________________________     [  ] Clinically Undetermined     [  ] Other Hematological Diagnosis _________________________________    [  ] Clinically Undetermined       Please document in your progress notes daily for the duration of treatment, until resolved, and include in your discharge summary.                                                                                                      "

## 2017-02-13 NOTE — PLAN OF CARE
Death Note    Called to bedside for asystole. No spontaneous respirations. No breath sounds via auscultation. No palpable pulses. No heart sounds via auscultation. Pupils fixed and dilated. Asystole on the monitor. Time of death 1611. Family at bedside.      Isabell Silva MD  Pulmonary/Critical Care Fellow  [phone]

## 2017-02-13 NOTE — PROGRESS NOTES
Plan of Care Note    W[facility]  and patient's wife and daughter/son who were at the bedside, who is currently in [place]. Wife, who is the primary decision maker, has agreed that continuing advanced life support is against the patient's wishes. We will begin with withdrawal of advanced life support.      Isabell Silva MD  Pulmonary/Critical Care Fellow  [phone]

## 2017-02-14 NOTE — PLAN OF CARE
17 0900   Final Note   Assessment Type Final Discharge Note   Discharge Disposition Exp Medical   Discharge planning education complete? No   Hospital Follow Up  Appt(s) scheduled? No   Discharge plans and expectations educations in teach back method with documentation complete? No   Offered [facility] -- Bedside Delivery? n/a   Discharge/Hospital Encounter Summary to (non-[facility]) PCP n/a   Referral to Outpatient Case Management complete? n/a   Referral to / orders for Home Health Complete? n/a   30 day supply of medicines given at discharge, if documented non-compliance / non-adherence? n/a   Any social issues identified prior to discharge? n/a   Did you assess the readiness or willingness of the family or caregiver to support self management of care? n/a   Right Care Referral Info   Post Acute Recommendation Other  ( in medical facility)   Caroline Hudson, RN, BSN  Case Management  [facility]  Ext. [phone]

## 2017-02-14 NOTE — PHYSICIAN QUERY
PT Name: Danilo Jordan  MR #: [id number]     Physician Query Form - Diagnosis Clarification    Reviewer: Asha Delgado RN, CDI   Ext : [phone]    This form is a permanent document in the medical record.     Query Date: February 14, 2017    By submitting this query, we are merely seeking further clarification of documentation.  Please utilize your independent clinical judgment when addressing the question(s) below.     (The Medical record reflects the following:)      Findings Supporting Clinical Information Location in Medical Record     # Shock, likely septic                  Elevated lactic acid level   Sent for full septic work up >> blood, urine, resp Cxs negative to date    -- start broad zosyn and Vanc>> Hx of UTI on ABx 3 days ago.      Progress notes 2/11/17  Critical Care        Discharge Summary     Please clarify if the _______septic ____________ diagnosis has been:                 [  x ]   Ruled In               [   ]   Ruled In, Now Resolved               [   ]   Resolved Prior to My Assessment               [   ]   Ruled Out               [   ]   Clinically insignificant               [   ]   Clinically undetermined               [   ]   Other/Clarification of findings:_____________________________________       Please document in your progress notes daily for the duration of treatment, until resolved, and include in your discharge summary.

## 2017-02-16 LAB — BACTERIA BLD CULT: NORMAL

## 2020-08-27 NOTE — ASSESSMENT & PLAN NOTE
-Corrected gap 16 borderline for NAGMA, given clinical picture suspect some non-gap component  -pH 7.319/CO2 19  -Will start bicarb gtt 1mEq @ 100  -BMP q6h       none

## 2023-05-31 NOTE — H&P
Radiology History & Physical      SUBJECTIVE:     Chief Complaint: HCC    History of Present Illness:  Danilo Jordna is a 71 y.o. male who presents for TACE  Past Medical History   Diagnosis Date    HCC (hepatocellular carcinoma) 5/17/2016    Hypertension      Past Surgical History   Procedure Laterality Date    Rotator cuff repair      Skin graft         Home Meds:   Prior to Admission medications    Medication Sig Start Date End Date Taking? Authorizing Provider   omeprazole (PRILOSEC) 10 MG capsule Take 10 mg by mouth once daily.   Yes Historical Provider, MD   SORAFENIB TOSYLATE (NEXAVAR ORAL) Take by mouth.   Yes Historical Provider, MD   furosemide (LASIX) 20 MG tablet Take 3 tablets (60 mg total) by mouth once daily.  Patient taking differently: Take 40 mg by mouth once daily. Taking 40mg per day 12/1/16   Marquez Cordero MD   losartan (COZAAR) 50 MG tablet Take 50 mg by mouth once daily.    Historical Provider, MD   spironolactone (ALDACTONE) 50 MG tablet Take 50 mg by mouth 2 (two) times daily.    Historical Provider, MD     Anticoagulants/Antiplatelets: no anticoagulation    Allergies: Review of patient's allergies indicates:  No Known Allergies  Sedation History:  no adverse reactions    Review of Systems:   Hematological: no known coagulopathies  Respiratory: no shortness of breath  Cardiovascular: no chest pain  Gastrointestinal: no abdominal pain  Genito-Urinary: no dysuria  Musculoskeletal: negative  Neurological: no TIA or stroke symptoms         OBJECTIVE:     Vital Signs (Most Recent)  Temp: 97.4 °F (36.3 °C) (01/19/17 1107)  Pulse: 81 (01/19/17 1107)  Resp: 18 (01/19/17 1107)  BP: 115/62 (01/19/17 1107)  SpO2: 100 % (01/19/17 1107)    Physical Exam:  ASA: 3  Mallampati: 1    General: no acute distress  Mental Status: alert and oriented to person, place and time  HEENT: normocephalic, atraumatic  Chest: unlabored breathing  Heart: regular heart rate  Abdomen:  nondistended  Extremity: moves all extremities    Laboratory  Lab Results   Component Value Date    INR 1.2 01/16/2017       Lab Results   Component Value Date    WBC 6.30 01/16/2017    WBC 6.30 01/16/2017    HGB 13.3 (L) 01/16/2017    HGB 13.3 (L) 01/16/2017    HCT 34 (L) 01/16/2017     (H) 01/16/2017     (H) 01/16/2017    PLT 94 (L) 01/16/2017    PLT 94 (L) 01/16/2017      Lab Results   Component Value Date    GLU 92 01/18/2017     (L) 01/18/2017    K 5.3 (H) 01/18/2017     (H) 01/18/2017    CO2 16 (L) 01/18/2017    BUN 35 (H) 01/18/2017    CREATININE 1.3 01/18/2017    CALCIUM 8.7 01/18/2017    ALT 24 01/18/2017    AST 50 (H) 01/18/2017    ALBUMIN 2.1 (L) 01/18/2017    BILITOT 2.4 (H) 01/18/2017    BILIDIR 1.7 (H) 01/16/2017       ASSESSMENT/PLAN:     Sedation Plan: moderate  Patient will undergo TACE.    JENNY Garnica, FNP  Interventional Radiology  [phone] spectralink     Contraindicated

## 2024-04-02 NOTE — CHAPLAIN
In response to the Spiritual Care consult requesting a , the pt was anointed by a visiting  on  and . Pt now .  
Received consult for  to visit; left message for  to visit tomorrow.  
Benign essential HTN